# Patient Record
Sex: MALE | Race: WHITE
[De-identification: names, ages, dates, MRNs, and addresses within clinical notes are randomized per-mention and may not be internally consistent; named-entity substitution may affect disease eponyms.]

---
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
## 2024-07-20 ENCOUNTER — EMERGENCY (EMERGENCY)
Facility: HOSPITAL | Age: 24
LOS: 1 days | Discharge: OTHER INSTITUTION | End: 2024-07-20
Attending: EMERGENCY MEDICINE
Payer: COMMERCIAL

## 2024-07-20 VITALS
WEIGHT: 143.3 LBS | RESPIRATION RATE: 18 BRPM | OXYGEN SATURATION: 99 % | SYSTOLIC BLOOD PRESSURE: 144 MMHG | TEMPERATURE: 99 F | HEIGHT: 67 IN | DIASTOLIC BLOOD PRESSURE: 114 MMHG | HEART RATE: 91 BPM

## 2024-07-20 DIAGNOSIS — F30.9 MANIC EPISODE, UNSPECIFIED: ICD-10-CM

## 2024-07-20 LAB
ALBUMIN SERPL ELPH-MCNC: 4.1 G/DL — SIGNIFICANT CHANGE UP (ref 3.3–5.2)
ALP SERPL-CCNC: 74 U/L — SIGNIFICANT CHANGE UP (ref 40–120)
ALT FLD-CCNC: 17 U/L — SIGNIFICANT CHANGE UP
AMPHET UR-MCNC: NEGATIVE — SIGNIFICANT CHANGE UP
ANION GAP SERPL CALC-SCNC: 15 MMOL/L — SIGNIFICANT CHANGE UP (ref 5–17)
APAP SERPL-MCNC: <3 UG/ML — LOW (ref 10–26)
APPEARANCE UR: CLEAR — SIGNIFICANT CHANGE UP
AST SERPL-CCNC: 25 U/L — SIGNIFICANT CHANGE UP
BARBITURATES UR SCN-MCNC: NEGATIVE — SIGNIFICANT CHANGE UP
BASOPHILS # BLD AUTO: 0.04 K/UL — SIGNIFICANT CHANGE UP (ref 0–0.2)
BASOPHILS NFR BLD AUTO: 0.5 % — SIGNIFICANT CHANGE UP (ref 0–2)
BENZODIAZ UR-MCNC: NEGATIVE — SIGNIFICANT CHANGE UP
BILIRUB SERPL-MCNC: 0.4 MG/DL — SIGNIFICANT CHANGE UP (ref 0.4–2)
BILIRUB UR-MCNC: NEGATIVE — SIGNIFICANT CHANGE UP
BUN SERPL-MCNC: 13.6 MG/DL — SIGNIFICANT CHANGE UP (ref 8–20)
CALCIUM SERPL-MCNC: 9.3 MG/DL — SIGNIFICANT CHANGE UP (ref 8.4–10.5)
CHLORIDE SERPL-SCNC: 100 MMOL/L — SIGNIFICANT CHANGE UP (ref 96–108)
CO2 SERPL-SCNC: 23 MMOL/L — SIGNIFICANT CHANGE UP (ref 22–29)
COCAINE METAB.OTHER UR-MCNC: NEGATIVE — SIGNIFICANT CHANGE UP
COLOR SPEC: YELLOW — SIGNIFICANT CHANGE UP
CREAT SERPL-MCNC: 0.94 MG/DL — SIGNIFICANT CHANGE UP (ref 0.5–1.3)
DIFF PNL FLD: NEGATIVE — SIGNIFICANT CHANGE UP
EGFR: 117 ML/MIN/1.73M2 — SIGNIFICANT CHANGE UP
EOSINOPHIL # BLD AUTO: 0.01 K/UL — SIGNIFICANT CHANGE UP (ref 0–0.5)
EOSINOPHIL NFR BLD AUTO: 0.1 % — SIGNIFICANT CHANGE UP (ref 0–6)
ETHANOL SERPL-MCNC: <10 MG/DL — SIGNIFICANT CHANGE UP (ref 0–9)
FENTANYL UR QL SCN: NEGATIVE — SIGNIFICANT CHANGE UP
GLUCOSE SERPL-MCNC: 96 MG/DL — SIGNIFICANT CHANGE UP (ref 70–99)
GLUCOSE UR QL: NEGATIVE MG/DL — SIGNIFICANT CHANGE UP
HCT VFR BLD CALC: 41.9 % — SIGNIFICANT CHANGE UP (ref 39–50)
HGB BLD-MCNC: 15.1 G/DL — SIGNIFICANT CHANGE UP (ref 13–17)
IMM GRANULOCYTES NFR BLD AUTO: 0.3 % — SIGNIFICANT CHANGE UP (ref 0–0.9)
KETONES UR-MCNC: NEGATIVE MG/DL — SIGNIFICANT CHANGE UP
LEUKOCYTE ESTERASE UR-ACNC: NEGATIVE — SIGNIFICANT CHANGE UP
LYMPHOCYTES # BLD AUTO: 1.84 K/UL — SIGNIFICANT CHANGE UP (ref 1–3.3)
LYMPHOCYTES # BLD AUTO: 25.3 % — SIGNIFICANT CHANGE UP (ref 13–44)
MCHC RBC-ENTMCNC: 28.3 PG — SIGNIFICANT CHANGE UP (ref 27–34)
MCHC RBC-ENTMCNC: 36 GM/DL — SIGNIFICANT CHANGE UP (ref 32–36)
MCV RBC AUTO: 78.5 FL — LOW (ref 80–100)
METHADONE UR-MCNC: NEGATIVE — SIGNIFICANT CHANGE UP
MONOCYTES # BLD AUTO: 0.72 K/UL — SIGNIFICANT CHANGE UP (ref 0–0.9)
MONOCYTES NFR BLD AUTO: 9.9 % — SIGNIFICANT CHANGE UP (ref 2–14)
NEUTROPHILS # BLD AUTO: 4.65 K/UL — SIGNIFICANT CHANGE UP (ref 1.8–7.4)
NEUTROPHILS NFR BLD AUTO: 63.9 % — SIGNIFICANT CHANGE UP (ref 43–77)
NITRITE UR-MCNC: NEGATIVE — SIGNIFICANT CHANGE UP
OPIATES UR-MCNC: NEGATIVE — SIGNIFICANT CHANGE UP
PCP SPEC-MCNC: SIGNIFICANT CHANGE UP
PCP UR-MCNC: NEGATIVE — SIGNIFICANT CHANGE UP
PH UR: 7 — SIGNIFICANT CHANGE UP (ref 5–8)
PLATELET # BLD AUTO: 219 K/UL — SIGNIFICANT CHANGE UP (ref 150–400)
POTASSIUM SERPL-MCNC: 3.7 MMOL/L — SIGNIFICANT CHANGE UP (ref 3.5–5.3)
POTASSIUM SERPL-SCNC: 3.7 MMOL/L — SIGNIFICANT CHANGE UP (ref 3.5–5.3)
PROT SERPL-MCNC: 6.9 G/DL — SIGNIFICANT CHANGE UP (ref 6.6–8.7)
PROT UR-MCNC: NEGATIVE MG/DL — SIGNIFICANT CHANGE UP
RBC # BLD: 5.34 M/UL — SIGNIFICANT CHANGE UP (ref 4.2–5.8)
RBC # FLD: 12.2 % — SIGNIFICANT CHANGE UP (ref 10.3–14.5)
SALICYLATES SERPL-MCNC: <0.6 MG/DL — LOW (ref 10–20)
SARS-COV-2 RNA SPEC QL NAA+PROBE: SIGNIFICANT CHANGE UP
SODIUM SERPL-SCNC: 138 MMOL/L — SIGNIFICANT CHANGE UP (ref 135–145)
SP GR SPEC: <1.005 — LOW (ref 1–1.03)
THC UR QL: NEGATIVE — SIGNIFICANT CHANGE UP
UROBILINOGEN FLD QL: 0.2 MG/DL — SIGNIFICANT CHANGE UP (ref 0.2–1)
WBC # BLD: 7.28 K/UL — SIGNIFICANT CHANGE UP (ref 3.8–10.5)
WBC # FLD AUTO: 7.28 K/UL — SIGNIFICANT CHANGE UP (ref 3.8–10.5)

## 2024-07-20 RX ORDER — DIVALPROEX SODIUM 500 MG
500 TABLET, DELAYED RELEASE (ENTERIC COATED) ORAL
Refills: 0 | Status: ACTIVE | OUTPATIENT
Start: 2024-07-20 | End: 2025-06-18

## 2024-07-20 RX ORDER — LORAZEPAM 0.5 MG
2 TABLET ORAL ONCE
Refills: 0 | Status: DISCONTINUED | OUTPATIENT
Start: 2024-07-20 | End: 2024-07-20

## 2024-07-20 RX ORDER — DIVALPROEX SODIUM 500 MG
500 TABLET, DELAYED RELEASE (ENTERIC COATED) ORAL ONCE
Refills: 0 | Status: COMPLETED | OUTPATIENT
Start: 2024-07-20 | End: 2024-07-20

## 2024-07-20 RX ORDER — DIVALPROEX SODIUM 500 MG
1 TABLET, DELAYED RELEASE (ENTERIC COATED) ORAL
Qty: 60 | Refills: 0
Start: 2024-07-20 | End: 2024-08-18

## 2024-07-20 RX ORDER — HALOPERIDOL DECANOATE 100 MG/ML
5 VIAL (ML) INTRAMUSCULAR ONCE
Refills: 0 | Status: COMPLETED | OUTPATIENT
Start: 2024-07-20 | End: 2024-07-20

## 2024-07-20 RX ORDER — DIPHENHYDRAMINE HCL 12.5MG/5ML
50 ELIXIR ORAL EVERY 6 HOURS
Refills: 0 | Status: ACTIVE | OUTPATIENT
Start: 2024-07-20 | End: 2025-06-18

## 2024-07-20 RX ORDER — LORAZEPAM 0.5 MG
2 TABLET ORAL EVERY 4 HOURS
Refills: 0 | Status: DISCONTINUED | OUTPATIENT
Start: 2024-07-20 | End: 2024-07-20

## 2024-07-20 RX ORDER — DIAZEPAM 10 MG/1
5 TABLET ORAL ONCE
Refills: 0 | Status: DISCONTINUED | OUTPATIENT
Start: 2024-07-20 | End: 2024-07-20

## 2024-07-20 RX ORDER — HALOPERIDOL DECANOATE 100 MG/ML
5 VIAL (ML) INTRAMUSCULAR EVERY 6 HOURS
Refills: 0 | Status: ACTIVE | OUTPATIENT
Start: 2024-07-20 | End: 2025-06-18

## 2024-07-20 RX ADMIN — Medication 50 MILLIGRAM(S): at 16:28

## 2024-07-20 RX ADMIN — Medication 5 MILLIGRAM(S): at 16:41

## 2024-07-20 RX ADMIN — Medication 2 MILLIGRAM(S): at 16:41

## 2024-07-20 RX ADMIN — DIAZEPAM 5 MILLIGRAM(S): 10 TABLET ORAL at 13:37

## 2024-07-20 RX ADMIN — Medication 5 MILLIGRAM(S): at 16:28

## 2024-07-20 RX ADMIN — Medication 2 MILLIGRAM(S): at 16:28

## 2024-07-20 RX ADMIN — Medication 50 MILLIGRAM(S): at 11:04

## 2024-07-20 RX ADMIN — Medication 500 MILLIGRAM(S): at 14:11

## 2024-07-20 NOTE — ED ADULT TRIAGE NOTE - CHIEF COMPLAINT QUOTE
Pt bought in having maniac episode. pt lost dad in October and is saying his dad is talking to him and needs to tell him I love him one more time. Pt haven't slept in 3 days.

## 2024-07-20 NOTE — ED ADULT NURSE NOTE - CAS EDN DISCHARGE ASSESSMENT
Alert and oriented to person, place and time/Awake Alert and oriented to person, place and time/Patient baseline mental status

## 2024-07-20 NOTE — ED BEHAVIORAL HEALTH ASSESSMENT NOTE - RISK ASSESSMENT
RF: Presenting signs and symptoms, 1 prior admission in 2020   PF: domiciled with mother, full time student athlete at [employer]

## 2024-07-20 NOTE — ED BEHAVIORAL HEALTH ASSESSMENT NOTE - NSBHATTESTAPPBILLTIME_PSY_A_CORE
I attest my time as RAND is greater than 50% of the total combined time spent on qualifying patient care activities. I have reviewed and verified the documentation.

## 2024-07-20 NOTE — ED PROVIDER NOTE - PROGRESS NOTE DETAILS
Pura Augustin MD, Attending  Patient received at signout pending evaluation by psych.  Patient pacing in circles with pressured speech, talking about "needing to train and getting the best  in the world'.     Psych attending reevaluated patient, shared decision making with patient's family, who would prefer outpatient management.  Per psych attending, patient also had prior similar episode triggered due to drug use.    Per psych attending, he will send outpatient meds, social work will obtain family service league appointment for patient, patient will continue with outpatient follow-up. Pura Augustin MD, Attending   Pt seen by sw. During that process, girlfriend now expressing patient is worse and is concerned about taking him home. Pt reassessed at bedside, still manic. Spoke to girlfriend and sister myself, who both states they feel pt is worse and does not feel he is safe to go home. Sherry MURPHY aware, pt sent to . Pura Augustin MD, Attending  Patient received at signout pending re-evaluation by psych. Sherry MURPHY evaluated pt overnight, and planned to re-eval in the AM. Pt now pacing in circles with pressured speech, talking about "needing to train and getting the best  in the world'.     Dr. Chacko reevaluated patient, and states shared decision making with patient's family (girlfriend and sister), who would prefer outpatient management. Per psych attending, patient also had prior similar episode triggered due to drug use. He is recommending outpatient follow up and he will send meds, also recommending on sw to obtain family service league appointment for patient.

## 2024-07-20 NOTE — ED BEHAVIORAL HEALTH NOTE - BEHAVIORAL HEALTH NOTE
Social Work Note: SW following for coordination of care. Patient initially seen by psych MD and was T&R with family service league follow up. SW attempted to meet with patient with girlfriend and family present however patient sleeping. SW then attempted to meet with patient again with girlfriend present. SW attempted to discuss recommended follow up with family service league and patient difficult to engage also with bizarre behavior. Girlfriend reports she and family noticed patients symptoms have gotten worse since this morning and is now requesting inpatient psychiatric transfer. Case discussed with MD and  team. Patient brought back to  ED. MOY will continue to follow.

## 2024-07-20 NOTE — ED PROVIDER NOTE - PATIENT PORTAL LINK FT
You can access the FollowMyHealth Patient Portal offered by [facility] by registering at the following website: [email or web address]. By joining [facility]’s FollowMyHealth portal, you will also be able to view your health information using other applications (apps) compatible with our system.

## 2024-07-20 NOTE — ED PROVIDER NOTE - CROS ED PSYCH ALL NEG
- - - Fever    A fever is an increase in the body's temperature above 100.4°F (38°C) or higher. In adults and children older than three months, a brief mild or moderate fever generally has no long-term effect, and it usually does not require treatment. Many times, fevers are the result of viral infections, which are self-resolving.  However, certain symptoms or diagnostic tests may suggest a bacterial infection that may respond to antibiotics. Take medications as directed by your health care provider.    SEEK IMMEDIATE MEDICAL CARE IF YOU OR YOUR CHILD HAVE ANY OF THE FOLLOWING SYMPTOMS : shortness of breath, seizure, rash/stiff neck/headache, severe abdominal pain, persistent vomiting, any signs of dehydration, or if your child has a fever for over five (5) days.    Please follow up with your pediatrician as needed.

## 2024-07-20 NOTE — ED PROVIDER NOTE - OBJECTIVE STATEMENT
24-year-old male presents with manic behavior.  as per patient and girlfriend at bedside, the patient's father recently , and ever since then he has been "spiraling downward".  The patient is having increasingly pressured speech and rapid thoughts,  is starting to act erratically and cause concern amongst the patient's family and friends.  Patient denies any physical complaints, and denies drug or alcohol use.  Patient denies hallucinations or suicidal ideations.  Of note, patient reports that he once had a "2-week episode of psychosis" that he reports was  trigger due to drug use.

## 2024-07-20 NOTE — ED ADULT NURSE NOTE - OBJECTIVE STATEMENT
23 yo M presents for psych eval. Pt. endorses not being able to sleep x3 days. Pt. states he "has too many dreams and ambitions to sleep". Pt. w/ pressured speech. Girlfriend at bedside. Resp. equal and unlabored b/l. VSS. NAD. Comfort measures provided, safety measures implemented, call bell in reach. MD at bedside.

## 2024-07-20 NOTE — ED PROVIDER NOTE - ENMT, MLM
Airway patent, Nasal mucosa clear. Mouth with normal mucosa. Throat has no vesicles, no oropharyngeal exudates and uvula is midline.
no back pain, no gout, no musculoskeletal pain, no neck pain, and no weakness.

## 2024-07-20 NOTE — ED PROVIDER NOTE - NSFOLLOWUPINSTRUCTIONS_ED_ALL_ED_FT
** Take your medication as prescribed.   ** Follow up with family service league.      ** Go to the nearest Emergency Department if you experience any new or concerning symptoms, such as:   - chest pain  - difficulty breathing  - passing out  - unable to eat or drink  - unable to move or feel part of your body  - fever, chills  - agitation, racing thoughts, not sleeping, not eating  - thoughts of harming yourself, thoughts of harming others.   - hallucinations.

## 2024-07-20 NOTE — ED BEHAVIORAL HEALTH ASSESSMENT NOTE - DESCRIPTION
no acute events; no stat meds    ICU Vital Signs Last 24 Hrs  T(C): 36.7 (20 Jul 2024 07:51), Max: 37.1 (20 Jul 2024 01:05)  T(F): 98.1 (20 Jul 2024 07:51), Max: 98.8 (20 Jul 2024 01:05)  HR: 77 (20 Jul 2024 07:51) (77 - 91)  BP: 108/78 (20 Jul 2024 07:51) (108/78 - 144/114)  BP(mean): --  ABP: --  ABP(mean): --  RR: 18 (20 Jul 2024 07:51) (18 - 18)  SpO2: 96% (20 Jul 2024 07:51) (96% - 99%)    O2 Parameters below as of 20 Jul 2024 07:51  Patient On (Oxygen Delivery Method): room air Domiciled with mother; full time student at [employer]; plays soccer at [employer] none no acute events; 1 stat order of PO Ativan    ICU Vital Signs Last 24 Hrs  T(C): 36.7 (20 Jul 2024 07:51), Max: 37.1 (20 Jul 2024 01:05)  T(F): 98.1 (20 Jul 2024 07:51), Max: 98.8 (20 Jul 2024 01:05)  HR: 77 (20 Jul 2024 07:51) (77 - 91)  BP: 108/78 (20 Jul 2024 07:51) (108/78 - 144/114)  BP(mean): --  ABP: --  ABP(mean): --  RR: 18 (20 Jul 2024 07:51) (18 - 18)  SpO2: 96% (20 Jul 2024 07:51) (96% - 99%)    O2 Parameters below as of 20 Jul 2024 07:51  Patient On (Oxygen Delivery Method): room air Domiciled with mother; full time student at [employer] where he plays soccer; father  in October 1 stat order of PO Ativan initially however pt later became acutely agitated and was moved to  where he was medicated twice.     ICU Vital Signs Last 24 Hrs  T(C): 36.7 (20 Jul 2024 07:51), Max: 37.1 (20 Jul 2024 01:05)  T(F): 98.1 (20 Jul 2024 07:51), Max: 98.8 (20 Jul 2024 01:05)  HR: 77 (20 Jul 2024 07:51) (77 - 91)  BP: 108/78 (20 Jul 2024 07:51) (108/78 - 144/114)  BP(mean): --  ABP: --  ABP(mean): --  RR: 18 (20 Jul 2024 07:51) (18 - 18)  SpO2: 96% (20 Jul 2024 07:51) (96% - 99%)    O2 Parameters below as of 20 Jul 2024 07:51  Patient On (Oxygen Delivery Method): room air
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
## 2024-07-20 NOTE — ED BEHAVIORAL HEALTH ASSESSMENT NOTE - NS ED BHA ED COURSE PSYCHIATRIC MEDICATION GIVEN
None Involuntary Intramuscular (indication, name, dose, time)/Oral Medication (indication, name, dose, time)
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
## 2024-07-21 ENCOUNTER — INPATIENT (INPATIENT)
Facility: HOSPITAL | Age: 24
LOS: 17 days | Discharge: HOME | End: 2024-08-08
Attending: STUDENT IN AN ORGANIZED HEALTH CARE EDUCATION/TRAINING PROGRAM | Admitting: STUDENT IN AN ORGANIZED HEALTH CARE EDUCATION/TRAINING PROGRAM
Payer: COMMERCIAL

## 2024-07-21 VITALS
TEMPERATURE: 98 F | HEART RATE: 83 BPM | RESPIRATION RATE: 18 BRPM | OXYGEN SATURATION: 98 % | DIASTOLIC BLOOD PRESSURE: 70 MMHG | SYSTOLIC BLOOD PRESSURE: 120 MMHG

## 2024-07-21 VITALS — TEMPERATURE: 97 F

## 2024-07-21 DIAGNOSIS — F32.9 MAJOR DEPRESSIVE DISORDER, SINGLE EPISODE, UNSPECIFIED: ICD-10-CM

## 2024-07-21 PROCEDURE — 99222 1ST HOSP IP/OBS MODERATE 55: CPT

## 2024-07-21 RX ORDER — HYDROXYZINE HCL 50 MG/ML
50 VIAL (ML) INTRAMUSCULAR EVERY 4 HOURS
Refills: 0 | Status: DISCONTINUED | OUTPATIENT
Start: 2024-07-21 | End: 2024-08-01

## 2024-07-21 RX ORDER — LORAZEPAM 1 MG/1
2 TABLET ORAL EVERY 4 HOURS
Refills: 0 | Status: DISCONTINUED | OUTPATIENT
Start: 2024-07-21 | End: 2024-07-24

## 2024-07-21 RX ORDER — DIPHENHYDRAMINE HCL 25 MG
50 CAPSULE ORAL EVERY 6 HOURS
Refills: 0 | Status: DISCONTINUED | OUTPATIENT
Start: 2024-07-21 | End: 2024-08-08

## 2024-07-21 RX ORDER — DIVALPROEX SODIUM 125 MG/1
500 CAPSULE, COATED PELLETS ORAL
Refills: 0 | Status: DISCONTINUED | OUTPATIENT
Start: 2024-07-21 | End: 2024-07-21

## 2024-07-21 RX ORDER — PRAMIPEXOLE DIHYDROCHLORIDE 0.5 MG/1
5 TABLET ORAL ONCE
Refills: 0 | Status: DISCONTINUED | OUTPATIENT
Start: 2024-07-21 | End: 2024-08-08

## 2024-07-21 RX ORDER — LORAZEPAM 1 MG/1
2 TABLET ORAL ONCE
Refills: 0 | Status: DISCONTINUED | OUTPATIENT
Start: 2024-07-21 | End: 2024-07-24

## 2024-07-21 RX ORDER — RISPERIDONE 1 MG/1
0.5 TABLET, FILM COATED ORAL
Refills: 0 | Status: DISCONTINUED | OUTPATIENT
Start: 2024-07-21 | End: 2024-07-22

## 2024-07-21 RX ORDER — DIPHENHYDRAMINE HCL 25 MG
50 CAPSULE ORAL ONCE
Refills: 0 | Status: DISCONTINUED | OUTPATIENT
Start: 2024-07-21 | End: 2024-08-08

## 2024-07-21 RX ORDER — PRAMIPEXOLE DIHYDROCHLORIDE 0.5 MG/1
5 TABLET ORAL EVERY 6 HOURS
Refills: 0 | Status: DISCONTINUED | OUTPATIENT
Start: 2024-07-21 | End: 2024-08-08

## 2024-07-21 RX ADMIN — LORAZEPAM 2 MILLIGRAM(S): 1 TABLET ORAL at 20:21

## 2024-07-21 RX ADMIN — Medication 100 MILLIGRAM(S): at 20:21

## 2024-07-21 RX ADMIN — Medication 50 MILLIGRAM(S): at 17:05

## 2024-07-21 RX ADMIN — RISPERIDONE 0.5 MILLIGRAM(S): 1 TABLET, FILM COATED ORAL at 20:21

## 2024-07-21 NOTE — PSYCHIATRIC REHAB INITIAL EVALUATION - NSBHPRRECOMMEND_PSY_ALL_CORE
Patient was able to adequately engage with writer during initial session.  Patient was oriented to unit 1S as well as the role/function of  and inpatient psychiatric rehabilitation programming. Patient was able to identify an appropriately rehabilitation goal which is exhibiting a substantial reduction in elated/angry acting out, and pressured speech that prevents mutual conversation. Psychiatric rehabilitation staff will meet with patient weekly to review progress towards identified goal.

## 2024-07-21 NOTE — BH INPATIENT PSYCHIATRY ASSESSMENT NOTE - NSTXMANICGOAL_PSY_ALL_CORE
Be able to identify the early signs of tc (e.g. sleep and mood changes) and to employ coping strategies to minimize acting out
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
## 2024-07-21 NOTE — CHART NOTE - NSCHARTNOTEFT_GEN_A_CORE
Screening Medical Evaluation    Patient Admitted from: [facility] ED    [facility] admitting diagnosis: Major depressive disorder with single episode      PAST MEDICAL & SURGICAL HISTORY:  No Past Medical & Surgical History Noted      ALLERGIES  No Known Allergies    Intolerances      SOCIAL HISTORY:  Patient reports the last time he smoked marijuana was four (4)months ago and drinks two (2) beers on days that he does drink alcohol. Patient denies current use of tobacco/nicotine, or other substances.    FAMILY HISTORY:  No pertinent family history in first degree relatives.      MEDICATIONS  (STANDING):  QUEtiapine 100 milliGRAM(s) Oral at bedtime  risperiDONE   Tablet 0.5 milliGRAM(s) Oral two times a day    MEDICATIONS  (PRN):  diphenhydrAMINE 50 milliGRAM(s) Oral every 6 hours PRN Rash and/or Itching/agitation/eps prophylaxis  diphenhydrAMINE Injectable 50 milliGRAM(s) IntraMuscular once PRN agitation/eps prophylaxis  haloperidol     Tablet 5 milliGRAM(s) Oral every 6 hours PRN agitation  haloperidol    Injectable 5 milliGRAM(s) IntraMuscular once PRN severe agitation  hydrOXYzine hydrochloride 50 milliGRAM(s) Oral every 4 hours PRN anxiety  LORazepam     Tablet 2 milliGRAM(s) Oral every 4 hours PRN anxiety/agitation  LORazepam   Injectable 2 milliGRAM(s) IntraMuscular once PRN severe anxiety/severe agitation      Vital Signs Last 24 Hrs  T(C): 36.7 (21 Jul 2024 20:22), Max: 36.7 (21 Jul 2024 20:22)  T(F): 98.1 (21 Jul 2024 20:22), Max: 98.1 (21 Jul 2024 20:22)  HR: 94 (21 Jul 2024 09:00) (94 - 94)  BP: --  BP(mean): --  RR: --  SpO2: --      CAPILLARY BLOOD GLUCOSE      PHYSICAL EXAM:  GENERAL: NAD, well-developed  HEAD:  Atraumatic, Normocephalic  EYES: EOMI, PERRLA, conjunctiva and sclera clear  NECK: Supple, No JVD  CHEST/LUNG: Clear to auscultation bilaterally; No wheeze  HEART: Regular rate and rhythm; No murmurs, rubs, or gallops  ABDOMEN: Soft, Nontender, Nondistended; Bowel sounds present  EXTREMITIES:  2+ Peripheral Pulses, No clubbing, cyanosis, or edema  PSYCH: AAOx3  NEUROLOGY: non-focal  SKIN: No rashes or lesions    LABS:      RADIOLOGY & ADDITIONAL TESTS:      Assessment and Plan:  24M admitted to [facility] for Major depressive disorder with single episode w/no known PMHx seen at bedside for medical screening evaluation. Patient has no acute medical complaints at this time. Patient denies fever, chills, headache, dizziness, lightheadedness, N/V, SOB, cough, congestion, chest pain, abdominal pain, dysuria, hematuria, diarrhea, constipation. Physical exam unremarkable, VSS. Admission labs pending. F/u EKG in AM.     1.) Major depressive disorder with single episode: Refer to primary team documentation for recs

## 2024-07-21 NOTE — BH INPATIENT PSYCHIATRY ASSESSMENT NOTE - NSDCCRITERIA_PSY_ALL_CORE
PT will no longer have symptoms of paranoia and tc and will be able to adhere to an out patient treatment plan.

## 2024-07-21 NOTE — BH INPATIENT PSYCHIATRY ASSESSMENT NOTE - PAST PSYCHOTROPIC MEDICATION
none currently. Pt was prescribed Depakote and Seroquel in the ED but appeared paranoid with psychosis so was started on Risperidone here at [facility] today.
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
## 2024-07-21 NOTE — BH INPATIENT PSYCHIATRY ASSESSMENT NOTE - DESCRIPTION
Domiciled with mother. 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  Father  in October.
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
## 2024-07-21 NOTE — BH INPATIENT PSYCHIATRY ASSESSMENT NOTE - NSBHMETABOLIC_PSY_ALL_CORE_FT
BMI:   HbA1c:   Glucose:   BP: --Vital Signs Last 24 Hrs  T(C): 36.2 (07-21-24 @ 06:40), Max: 36.2 (07-21-24 @ 06:40)  T(F): 97.1 (07-21-24 @ 06:40), Max: 97.1 (07-21-24 @ 06:40)  HR: --  BP: --  BP(mean): --  RR: --  SpO2: --    Orthostatic VS  07-21-24 @ 06:40  Lying BP: --/-- HR: --  Sitting BP: 119/75 HR: 87  Standing BP: 117/75 HR: 117  Site: --  Mode: --    Lipid Panel:

## 2024-07-21 NOTE — BH INPATIENT PSYCHIATRY ASSESSMENT NOTE - NSBHCHARTREVIEWVS_PSY_A_CORE FT
Vital Signs Last 24 Hrs  T(C): 36.2 (07-21-24 @ 06:40), Max: 36.2 (07-21-24 @ 06:40)  T(F): 97.1 (07-21-24 @ 06:40), Max: 97.1 (07-21-24 @ 06:40)  HR: --  BP: --  BP(mean): --  RR: --  SpO2: --    Orthostatic VS  07-21-24 @ 06:40  Lying BP: --/-- HR: --  Sitting BP: 119/75 HR: 87  Standing BP: 117/75 HR: 117  Site: --  Mode: --

## 2024-07-21 NOTE — BH INPATIENT PSYCHIATRY ASSESSMENT NOTE - CURRENT MEDICATION
MEDICATIONS  (STANDING):  QUEtiapine 100 milliGRAM(s) Oral at bedtime  risperiDONE   Tablet 0.5 milliGRAM(s) Oral two times a day    MEDICATIONS  (PRN):  diphenhydrAMINE 50 milliGRAM(s) Oral every 6 hours PRN Rash and/or Itching/agitation/eps prophylaxis  diphenhydrAMINE Injectable 50 milliGRAM(s) IntraMuscular once PRN agitation/eps prophylaxis  haloperidol     Tablet 5 milliGRAM(s) Oral every 6 hours PRN agitation  haloperidol    Injectable 5 milliGRAM(s) IntraMuscular once PRN severe agitation  LORazepam     Tablet 2 milliGRAM(s) Oral every 4 hours PRN anxiety/agitation  LORazepam   Injectable 2 milliGRAM(s) IntraMuscular once PRN severe anxiety/severe agitation
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
## 2024-07-22 LAB
A1C WITH ESTIMATED AVERAGE GLUCOSE RESULT: 4.9 % — SIGNIFICANT CHANGE UP (ref 4–5.6)
CHOLEST SERPL-MCNC: 167 MG/DL — SIGNIFICANT CHANGE UP
ESTIMATED AVERAGE GLUCOSE: 94 — SIGNIFICANT CHANGE UP
HDLC SERPL-MCNC: 67 MG/DL — SIGNIFICANT CHANGE UP
LIPID PNL WITH DIRECT LDL SERPL: 91 MG/DL — SIGNIFICANT CHANGE UP
NON HDL CHOLESTEROL: 100 MG/DL — SIGNIFICANT CHANGE UP
TRIGL SERPL-MCNC: 45 MG/DL — SIGNIFICANT CHANGE UP
TSH SERPL-MCNC: 1.73 UIU/ML — SIGNIFICANT CHANGE UP (ref 0.27–4.2)

## 2024-07-22 PROCEDURE — 90853 GROUP PSYCHOTHERAPY: CPT

## 2024-07-22 PROCEDURE — 99232 SBSQ HOSP IP/OBS MODERATE 35: CPT

## 2024-07-22 PROCEDURE — 93010 ELECTROCARDIOGRAM REPORT: CPT

## 2024-07-22 RX ORDER — RISPERIDONE 1 MG/1
2 TABLET, FILM COATED ORAL AT BEDTIME
Refills: 0 | Status: DISCONTINUED | OUTPATIENT
Start: 2024-07-22 | End: 2024-07-24

## 2024-07-22 RX ADMIN — RISPERIDONE 2 MILLIGRAM(S): 1 TABLET, FILM COATED ORAL at 21:02

## 2024-07-22 RX ADMIN — Medication 100 MILLIGRAM(S): at 21:01

## 2024-07-22 RX ADMIN — LORAZEPAM 2 MILLIGRAM(S): 1 TABLET ORAL at 08:55

## 2024-07-22 RX ADMIN — LORAZEPAM 2 MILLIGRAM(S): 1 TABLET ORAL at 16:55

## 2024-07-22 RX ADMIN — RISPERIDONE 0.5 MILLIGRAM(S): 1 TABLET, FILM COATED ORAL at 08:44

## 2024-07-22 RX ADMIN — Medication 50 MILLIGRAM(S): at 21:02

## 2024-07-22 NOTE — BH INPATIENT PSYCHIATRY PROGRESS NOTE - NSBHCHARTREVIEWVS_PSY_A_CORE FT
Vital Signs Last 24 Hrs  T(C): 35.8 (07-22-24 @ 06:23), Max: 36.7 (07-21-24 @ 20:22)  T(F): 96.5 (07-22-24 @ 06:23), Max: 98.1 (07-21-24 @ 20:22)  HR: --  BP: --  BP(mean): --  RR: --  SpO2: --    Orthostatic VS  07-22-24 @ 06:23  Lying BP: --/-- HR: --  Sitting BP: 130/99 HR: 103  Standing BP: 116/87 HR: 106  Site: --  Mode: --  Orthostatic VS  07-21-24 @ 06:40  Lying BP: --/-- HR: --  Sitting BP: 119/75 HR: 87  Standing BP: 117/75 HR: 117  Site: --  Mode: --

## 2024-07-22 NOTE — BH INPATIENT PSYCHIATRY PROGRESS NOTE - CURRENT MEDICATION
MEDICATIONS  (STANDING):  QUEtiapine 100 milliGRAM(s) Oral at bedtime  risperiDONE   Tablet 0.5 milliGRAM(s) Oral two times a day    MEDICATIONS  (PRN):  diphenhydrAMINE 50 milliGRAM(s) Oral every 6 hours PRN Rash and/or Itching/agitation/eps prophylaxis  diphenhydrAMINE Injectable 50 milliGRAM(s) IntraMuscular once PRN agitation/eps prophylaxis  haloperidol     Tablet 5 milliGRAM(s) Oral every 6 hours PRN agitation  haloperidol    Injectable 5 milliGRAM(s) IntraMuscular once PRN severe agitation  hydrOXYzine hydrochloride 50 milliGRAM(s) Oral every 4 hours PRN anxiety  LORazepam     Tablet 2 milliGRAM(s) Oral every 4 hours PRN anxiety/agitation  LORazepam   Injectable 2 milliGRAM(s) IntraMuscular once PRN severe anxiety/severe agitation

## 2024-07-22 NOTE — BH INPATIENT PSYCHIATRY PROGRESS NOTE - NSBHFUPINTERVALHXFT_PSY_A_CORE
Chart reviewed, including vital signs, medication administration record, lab results, and nursing notes.   Case discussed with nursing, psychology, psych rehab, and social work in team meeting.   - No acute events overnight    Patient is seen in the group room under no acute distress and amenable to interview. The patient exhibits numerous signs of tc today, including pressured speech, grandiosity, increased activity, and indiscretion. He states that he needs to be discharged because he plans to be the “best  in the [place]”. He has a soccer match coming up, and he expresses his need to be discharged to play soccer, see his dog and his girlfriend. He is unable to recount the events that led up to his hospitalization. When asked, he states that he “let the opinions of others affect him “. He denies any symptoms of depression. Denies any symptoms of psychosis including hallucinations, paranoia, or delusions. Denies any recent drug use. Denies any history of suicidal ideation. He denies any issues with his medication.

## 2024-07-22 NOTE — BH SOCIAL WORK INITIAL PSYCHOSOCIAL EVALUATION - NSPTSTATEDGOAL_PSY_ALL_CORE
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
## 2024-07-22 NOTE — ED BEHAVIORAL HEALTH NOTE - BEHAVIORAL HEALTH NOTE
MOY Note: Pt was transferred to [facility] on 7/21/24 for IP psychiatric care. Notified by [facility] that the pt has healthfirst ins and precert is required. Called  [phone], spoke with Radha Jones completed, pending auth# [id number]. Clinicals faxed to [phone] for review. [facility] aware and will follow

## 2024-07-22 NOTE — BH INPATIENT PSYCHIATRY PROGRESS NOTE - PRN MEDS
MEDICATIONS  (PRN):  diphenhydrAMINE 50 milliGRAM(s) Oral every 6 hours PRN Rash and/or Itching/agitation/eps prophylaxis  diphenhydrAMINE Injectable 50 milliGRAM(s) IntraMuscular once PRN agitation/eps prophylaxis  haloperidol     Tablet 5 milliGRAM(s) Oral every 6 hours PRN agitation  haloperidol    Injectable 5 milliGRAM(s) IntraMuscular once PRN severe agitation  hydrOXYzine hydrochloride 50 milliGRAM(s) Oral every 4 hours PRN anxiety  LORazepam     Tablet 2 milliGRAM(s) Oral every 4 hours PRN anxiety/agitation  LORazepam   Injectable 2 milliGRAM(s) IntraMuscular once PRN severe anxiety/severe agitation

## 2024-07-22 NOTE — BH INPATIENT PSYCHIATRY PROGRESS NOTE - NSBHMETABOLIC_PSY_ALL_CORE_FT
BMI:   HbA1c: A1C with Estimated Average Glucose Result: 4.9 % (07-22-24 @ 09:15)    Glucose:   BP: --Vital Signs Last 24 Hrs  T(C): 35.8 (07-22-24 @ 06:23), Max: 36.7 (07-21-24 @ 20:22)  T(F): 96.5 (07-22-24 @ 06:23), Max: 98.1 (07-21-24 @ 20:22)  HR: --  BP: --  BP(mean): --  RR: --  SpO2: --    Orthostatic VS  07-22-24 @ 06:23  Lying BP: --/-- HR: --  Sitting BP: 130/99 HR: 103  Standing BP: 116/87 HR: 106  Site: --  Mode: --  Orthostatic VS  07-21-24 @ 06:40  Lying BP: --/-- HR: --  Sitting BP: 119/75 HR: 87  Standing BP: 117/75 HR: 117  Site: --  Mode: --    Lipid Panel: Date/Time: 07-22-24 @ 09:15  Cholesterol, Serum: 167  LDL Cholesterol Calculated: 91  HDL Cholesterol, Serum: 67  Total Cholesterol/HDL Ration Measurement: --  Triglycerides, Serum: 45

## 2024-07-23 PROCEDURE — 99232 SBSQ HOSP IP/OBS MODERATE 35: CPT

## 2024-07-23 RX ORDER — MELATONIN 3 MG
5 TABLET ORAL ONCE
Refills: 0 | Status: DISCONTINUED | OUTPATIENT
Start: 2024-07-23 | End: 2024-07-29

## 2024-07-23 RX ORDER — TRAZODONE HCL 100 MG
50 TABLET ORAL AT BEDTIME
Refills: 0 | Status: DISCONTINUED | OUTPATIENT
Start: 2024-07-23 | End: 2024-07-30

## 2024-07-23 RX ADMIN — Medication 50 MILLIGRAM(S): at 11:12

## 2024-07-23 RX ADMIN — LORAZEPAM 2 MILLIGRAM(S): 1 TABLET ORAL at 09:34

## 2024-07-23 RX ADMIN — Medication 50 MILLIGRAM(S): at 11:19

## 2024-07-23 RX ADMIN — Medication 50 MILLIGRAM(S): at 22:47

## 2024-07-23 RX ADMIN — RISPERIDONE 2 MILLIGRAM(S): 1 TABLET, FILM COATED ORAL at 20:02

## 2024-07-23 RX ADMIN — Medication 50 MILLIGRAM(S): at 20:02

## 2024-07-23 NOTE — BH INPATIENT PSYCHIATRY PROGRESS NOTE - NSBHCHARTREVIEWVS_PSY_A_CORE FT
Vital Signs Last 24 Hrs  T(C): 36.7 (07-23-24 @ 08:11), Max: 36.7 (07-23-24 @ 08:11)  T(F): 98 (07-23-24 @ 08:11), Max: 98 (07-23-24 @ 08:11)  HR: --  BP: --  BP(mean): --  RR: 17 (07-23-24 @ 08:11) (17 - 17)  SpO2: --    Orthostatic VS  07-23-24 @ 08:11  Lying BP: --/-- HR: --  Sitting BP: 137/87 HR: 96  Standing BP: 118/74 HR: 100  Site: upper right arm  Mode: electronic  Orthostatic VS  07-22-24 @ 06:23  Lying BP: --/-- HR: --  Sitting BP: 130/99 HR: 103  Standing BP: 116/87 HR: 106  Site: --  Mode: --

## 2024-07-23 NOTE — BH INPATIENT PSYCHIATRY PROGRESS NOTE - NSBHFUPINTERVALHXFT_PSY_A_CORE
Chart reviewed, including vital signs, medication administration record, lab results, and nursing notes.   Case discussed with nursing, psychology, psych rehab, and social work in team meeting.   - No acute events overnight    Patient is seen in the group room under no acute distress and amenable to interview. The patient continues to exhibit signs of tc today, including pressured speech, grandiosity, and mood lability. He agrees that he was exhibiting symptoms of tc previously, but now feels that he is better and is ready to be discharged. He is looking forward to seeing his girlfriend and dog. He’s also looking forward to training for his next soccer match. We discussed the plan to discontinue Seroquel and trazodone to help with sleep. Patient reports stable sleep and appetite. Denies SI/HI/AVH. Reports no medication side effects.

## 2024-07-23 NOTE — BH INPATIENT PSYCHIATRY PROGRESS NOTE - CURRENT MEDICATION
MEDICATIONS  (STANDING):  melatonin. 5 milliGRAM(s) Oral once  risperiDONE   Tablet 2 milliGRAM(s) Oral at bedtime  traZODone 50 milliGRAM(s) Oral at bedtime    MEDICATIONS  (PRN):  diphenhydrAMINE 50 milliGRAM(s) Oral every 6 hours PRN Rash and/or Itching/agitation/eps prophylaxis  diphenhydrAMINE Injectable 50 milliGRAM(s) IntraMuscular once PRN agitation/eps prophylaxis  haloperidol     Tablet 5 milliGRAM(s) Oral every 6 hours PRN agitation  haloperidol    Injectable 5 milliGRAM(s) IntraMuscular once PRN severe agitation  hydrOXYzine hydrochloride 50 milliGRAM(s) Oral every 4 hours PRN anxiety  LORazepam     Tablet 2 milliGRAM(s) Oral every 4 hours PRN anxiety/agitation  LORazepam   Injectable 2 milliGRAM(s) IntraMuscular once PRN severe anxiety/severe agitation

## 2024-07-23 NOTE — BH INPATIENT PSYCHIATRY PROGRESS NOTE - NSBHMETABOLIC_PSY_ALL_CORE_FT
BMI:   HbA1c: A1C with Estimated Average Glucose Result: 4.9 % (07-22-24 @ 09:15)    Glucose:   BP: --Vital Signs Last 24 Hrs  T(C): 36.7 (07-23-24 @ 08:11), Max: 36.7 (07-23-24 @ 08:11)  T(F): 98 (07-23-24 @ 08:11), Max: 98 (07-23-24 @ 08:11)  HR: --  BP: --  BP(mean): --  RR: 17 (07-23-24 @ 08:11) (17 - 17)  SpO2: --    Orthostatic VS  07-23-24 @ 08:11  Lying BP: --/-- HR: --  Sitting BP: 137/87 HR: 96  Standing BP: 118/74 HR: 100  Site: upper right arm  Mode: electronic  Orthostatic VS  07-22-24 @ 06:23  Lying BP: --/-- HR: --  Sitting BP: 130/99 HR: 103  Standing BP: 116/87 HR: 106  Site: --  Mode: --    Lipid Panel: Date/Time: 07-22-24 @ 09:15  Cholesterol, Serum: 167  LDL Cholesterol Calculated: 91  HDL Cholesterol, Serum: 67  Total Cholesterol/HDL Ration Measurement: --  Triglycerides, Serum: 45

## 2024-07-24 PROCEDURE — 90853 GROUP PSYCHOTHERAPY: CPT

## 2024-07-24 PROCEDURE — 99232 SBSQ HOSP IP/OBS MODERATE 35: CPT

## 2024-07-24 RX ORDER — LORAZEPAM 1 MG/1
2 TABLET ORAL ONCE
Refills: 0 | Status: DISCONTINUED | OUTPATIENT
Start: 2024-07-24 | End: 2024-07-31

## 2024-07-24 RX ORDER — LORAZEPAM 1 MG/1
2 TABLET ORAL AT BEDTIME
Refills: 0 | Status: DISCONTINUED | OUTPATIENT
Start: 2024-07-24 | End: 2024-07-30

## 2024-07-24 RX ORDER — LORAZEPAM 1 MG/1
2 TABLET ORAL EVERY 4 HOURS
Refills: 0 | Status: DISCONTINUED | OUTPATIENT
Start: 2024-07-24 | End: 2024-07-31

## 2024-07-24 RX ORDER — RISPERIDONE 1 MG/1
3 TABLET, FILM COATED ORAL AT BEDTIME
Refills: 0 | Status: DISCONTINUED | OUTPATIENT
Start: 2024-07-24 | End: 2024-07-26

## 2024-07-24 RX ADMIN — LORAZEPAM 2 MILLIGRAM(S): 1 TABLET ORAL at 21:08

## 2024-07-24 RX ADMIN — LORAZEPAM 2 MILLIGRAM(S): 1 TABLET ORAL at 10:15

## 2024-07-24 RX ADMIN — Medication 50 MILLIGRAM(S): at 22:07

## 2024-07-24 RX ADMIN — RISPERIDONE 3 MILLIGRAM(S): 1 TABLET, FILM COATED ORAL at 22:07

## 2024-07-24 NOTE — BH INPATIENT PSYCHIATRY PROGRESS NOTE - NSBHMETABOLIC_PSY_ALL_CORE_FT
BMI:   HbA1c: A1C with Estimated Average Glucose Result: 4.9 % (07-22-24 @ 09:15)    Glucose:   BP: --Vital Signs Last 24 Hrs  T(C): 36.4 (07-24-24 @ 06:27), Max: 36.7 (07-23-24 @ 20:14)  T(F): 97.6 (07-24-24 @ 06:27), Max: 98.1 (07-23-24 @ 20:14)  HR: --  BP: --  BP(mean): --  RR: 19 (07-24-24 @ 06:27) (19 - 19)  SpO2: --    Orthostatic VS  07-24-24 @ 06:27  Lying BP: --/-- HR: --  Sitting BP: 128/80 HR: 101  Standing BP: 128/85 HR: 108  Site: --  Mode: --  Orthostatic VS  07-23-24 @ 08:11  Lying BP: --/-- HR: --  Sitting BP: 137/87 HR: 96  Standing BP: 118/74 HR: 100  Site: upper right arm  Mode: electronic    Lipid Panel: Date/Time: 07-22-24 @ 09:15  Cholesterol, Serum: 167  LDL Cholesterol Calculated: 91  HDL Cholesterol, Serum: 67  Total Cholesterol/HDL Ration Measurement: --  Triglycerides, Serum: 45

## 2024-07-24 NOTE — BH INPATIENT PSYCHIATRY PROGRESS NOTE - NSBHCHARTREVIEWVS_PSY_A_CORE FT
Vital Signs Last 24 Hrs  T(C): 36.4 (07-24-24 @ 06:27), Max: 36.7 (07-23-24 @ 20:14)  T(F): 97.6 (07-24-24 @ 06:27), Max: 98.1 (07-23-24 @ 20:14)  HR: --  BP: --  BP(mean): --  RR: 19 (07-24-24 @ 06:27) (19 - 19)  SpO2: --    Orthostatic VS  07-24-24 @ 06:27  Lying BP: --/-- HR: --  Sitting BP: 128/80 HR: 101  Standing BP: 128/85 HR: 108  Site: --  Mode: --  Orthostatic VS  07-23-24 @ 08:11  Lying BP: --/-- HR: --  Sitting BP: 137/87 HR: 96  Standing BP: 118/74 HR: 100  Site: upper right arm  Mode: electronic

## 2024-07-24 NOTE — BH INPATIENT PSYCHIATRY PROGRESS NOTE - CURRENT MEDICATION
MEDICATIONS  (STANDING):  LORazepam     Tablet 2 milliGRAM(s) Oral at bedtime  melatonin. 5 milliGRAM(s) Oral once  risperiDONE   Tablet 3 milliGRAM(s) Oral at bedtime  traZODone 50 milliGRAM(s) Oral at bedtime    MEDICATIONS  (PRN):  diphenhydrAMINE 50 milliGRAM(s) Oral every 6 hours PRN Rash and/or Itching/agitation/eps prophylaxis  diphenhydrAMINE Injectable 50 milliGRAM(s) IntraMuscular once PRN agitation/eps prophylaxis  haloperidol     Tablet 5 milliGRAM(s) Oral every 6 hours PRN agitation  haloperidol    Injectable 5 milliGRAM(s) IntraMuscular once PRN severe agitation  hydrOXYzine hydrochloride 50 milliGRAM(s) Oral every 4 hours PRN anxiety  LORazepam     Tablet 2 milliGRAM(s) Oral every 4 hours PRN anxiety/agitation  LORazepam   Injectable 2 milliGRAM(s) IntraMuscular once PRN severe anxiety/severe agitation

## 2024-07-24 NOTE — BH INPATIENT PSYCHIATRY PROGRESS NOTE - NSBHFUPINTERVALHXFT_PSY_A_CORE
Chart reviewed, including vital signs, medication administration record, lab results, and nursing notes.   Case discussed with nursing, psychology, psych rehab, and social work in team meeting.   - No acute events overnight    Patient is seen in the group room under no acute distress and amenable to interview. The patient reports feeling nervous about his continued hospitalization. He continues to exhibit signs of tc, including grandiosity, pressured speech, and tangential thought process. 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 He believes that there will be a “documentary made about him “after he leaves. Reports no medication side effects. Reports stable appetite. Denies SI/HI/AVH. We discussed the plan to increase the dose of Risperdal and Ativan to help with sleep.

## 2024-07-25 PROCEDURE — 99232 SBSQ HOSP IP/OBS MODERATE 35: CPT

## 2024-07-25 PROCEDURE — 90853 GROUP PSYCHOTHERAPY: CPT

## 2024-07-25 RX ADMIN — LORAZEPAM 2 MILLIGRAM(S): 1 TABLET ORAL at 21:32

## 2024-07-25 RX ADMIN — RISPERIDONE 3 MILLIGRAM(S): 1 TABLET, FILM COATED ORAL at 21:32

## 2024-07-25 RX ADMIN — Medication 50 MILLIGRAM(S): at 21:31

## 2024-07-25 NOTE — BH INPATIENT PSYCHIATRY PROGRESS NOTE - NSBHCHARTREVIEWVS_PSY_A_CORE FT
Vital Signs Last 24 Hrs  T(C): 36.3 (07-25-24 @ 08:41), Max: 36.7 (07-24-24 @ 20:57)  T(F): 97.4 (07-25-24 @ 08:41), Max: 98 (07-24-24 @ 20:57)  HR: --  BP: --  BP(mean): --  RR: 16 (07-25-24 @ 08:41) (16 - 16)  SpO2: --    Orthostatic VS  07-25-24 @ 08:41  Lying BP: --/-- HR: --  Sitting BP: 116/75 HR: 79  Standing BP: 105/62 HR: 100  Site: --  Mode: --  Orthostatic VS  07-24-24 @ 06:27  Lying BP: --/-- HR: --  Sitting BP: 128/80 HR: 101  Standing BP: 128/85 HR: 108  Site: --  Mode: --

## 2024-07-25 NOTE — BH INPATIENT PSYCHIATRY PROGRESS NOTE - NSBHMETABOLIC_PSY_ALL_CORE_FT
BMI:   HbA1c: A1C with Estimated Average Glucose Result: 4.9 % (07-22-24 @ 09:15)    Glucose:   BP: --Vital Signs Last 24 Hrs  T(C): 36.3 (07-25-24 @ 08:41), Max: 36.7 (07-24-24 @ 20:57)  T(F): 97.4 (07-25-24 @ 08:41), Max: 98 (07-24-24 @ 20:57)  HR: --  BP: --  BP(mean): --  RR: 16 (07-25-24 @ 08:41) (16 - 16)  SpO2: --    Orthostatic VS  07-25-24 @ 08:41  Lying BP: --/-- HR: --  Sitting BP: 116/75 HR: 79  Standing BP: 105/62 HR: 100  Site: --  Mode: --  Orthostatic VS  07-24-24 @ 06:27  Lying BP: --/-- HR: --  Sitting BP: 128/80 HR: 101  Standing BP: 128/85 HR: 108  Site: --  Mode: --    Lipid Panel: Date/Time: 07-22-24 @ 09:15  Cholesterol, Serum: 167  LDL Cholesterol Calculated: 91  HDL Cholesterol, Serum: 67  Total Cholesterol/HDL Ration Measurement: --  Triglycerides, Serum: 45

## 2024-07-25 NOTE — BH INPATIENT PSYCHIATRY PROGRESS NOTE - NSBHFUPINTERVALHXFT_PSY_A_CORE
Chart reviewed, including vital signs, medication administration record, lab results, and nursing notes.   Case discussed with nursing, psychology, psych rehab, and social work in team meeting.   - No acute events overnight    Patient is seen in the group room under no acute distress and amenable to interview. The patient’s manic symptoms appear much improved. States that he slept much better last night. He agrees with the diagnosis of bipolar disorder and the assessment that he was admitted for a manic episode. Patient reports stable sleep and appetite. Denies SI/HI/AVH. Reports no medication side effects.

## 2024-07-26 PROCEDURE — 99232 SBSQ HOSP IP/OBS MODERATE 35: CPT

## 2024-07-26 PROCEDURE — 90853 GROUP PSYCHOTHERAPY: CPT

## 2024-07-26 RX ORDER — RISPERIDONE 1 MG/1
4 TABLET, FILM COATED ORAL AT BEDTIME
Refills: 0 | Status: DISCONTINUED | OUTPATIENT
Start: 2024-07-26 | End: 2024-07-29

## 2024-07-26 RX ADMIN — RISPERIDONE 4 MILLIGRAM(S): 1 TABLET, FILM COATED ORAL at 22:24

## 2024-07-26 RX ADMIN — LORAZEPAM 2 MILLIGRAM(S): 1 TABLET ORAL at 22:25

## 2024-07-26 RX ADMIN — Medication 50 MILLIGRAM(S): at 22:25

## 2024-07-26 NOTE — BH INPATIENT PSYCHIATRY PROGRESS NOTE - NSBHFUPINTERVALHXFT_PSY_A_CORE
Chart reviewed, including vital signs, medication administration record, lab results, and nursing notes.   Case discussed with nursing, psychology, psych rehab, and social work in team meeting.   - No acute events overnight    Patient is seen in the group room under no acute distress and amenable to interview. The patient appears more pressured today. 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 He requests discharge again. We discuss the plan to increase the dose of Risperdal. Patient reports stable sleep and appetite. Denies SI/HI/AVH. Reports no medication side effects.

## 2024-07-26 NOTE — BH INPATIENT PSYCHIATRY PROGRESS NOTE - CURRENT MEDICATION
MEDICATIONS  (STANDING):  LORazepam     Tablet 2 milliGRAM(s) Oral at bedtime  melatonin. 5 milliGRAM(s) Oral once  risperiDONE   Tablet 4 milliGRAM(s) Oral at bedtime  traZODone 50 milliGRAM(s) Oral at bedtime    MEDICATIONS  (PRN):  diphenhydrAMINE 50 milliGRAM(s) Oral every 6 hours PRN Rash and/or Itching/agitation/eps prophylaxis  diphenhydrAMINE Injectable 50 milliGRAM(s) IntraMuscular once PRN agitation/eps prophylaxis  haloperidol     Tablet 5 milliGRAM(s) Oral every 6 hours PRN agitation  haloperidol    Injectable 5 milliGRAM(s) IntraMuscular once PRN severe agitation  hydrOXYzine hydrochloride 50 milliGRAM(s) Oral every 4 hours PRN anxiety  LORazepam     Tablet 2 milliGRAM(s) Oral every 4 hours PRN anxiety/agitation  LORazepam   Injectable 2 milliGRAM(s) IntraMuscular once PRN severe anxiety/severe agitation

## 2024-07-26 NOTE — BH INPATIENT PSYCHIATRY PROGRESS NOTE - NSBHCHARTREVIEWVS_PSY_A_CORE FT
Vital Signs Last 24 Hrs  T(C): 36.4 (07-26-24 @ 06:11), Max: 37.7 (07-25-24 @ 20:52)  T(F): 97.6 (07-26-24 @ 06:11), Max: 99.8 (07-25-24 @ 20:52)  HR: --  BP: --  BP(mean): --  RR: 18 (07-26-24 @ 06:11) (18 - 18)  SpO2: --    Orthostatic VS  07-26-24 @ 06:11  Lying BP: --/-- HR: --  Sitting BP: 128/80 HR: 88  Standing BP: 135/90 HR: 92  Site: --  Mode: --  Orthostatic VS  07-25-24 @ 08:41  Lying BP: --/-- HR: --  Sitting BP: 116/75 HR: 79  Standing BP: 105/62 HR: 100  Site: --  Mode: --

## 2024-07-26 NOTE — BH INPATIENT PSYCHIATRY PROGRESS NOTE - NSBHMETABOLIC_PSY_ALL_CORE_FT
BMI:   HbA1c: A1C with Estimated Average Glucose Result: 4.9 % (07-22-24 @ 09:15)    Glucose:   BP: --Vital Signs Last 24 Hrs  T(C): 36.4 (07-26-24 @ 06:11), Max: 37.7 (07-25-24 @ 20:52)  T(F): 97.6 (07-26-24 @ 06:11), Max: 99.8 (07-25-24 @ 20:52)  HR: --  BP: --  BP(mean): --  RR: 18 (07-26-24 @ 06:11) (18 - 18)  SpO2: --    Orthostatic VS  07-26-24 @ 06:11  Lying BP: --/-- HR: --  Sitting BP: 128/80 HR: 88  Standing BP: 135/90 HR: 92  Site: --  Mode: --  Orthostatic VS  07-25-24 @ 08:41  Lying BP: --/-- HR: --  Sitting BP: 116/75 HR: 79  Standing BP: 105/62 HR: 100  Site: --  Mode: --    Lipid Panel: Date/Time: 07-22-24 @ 09:15  Cholesterol, Serum: 167  LDL Cholesterol Calculated: 91  HDL Cholesterol, Serum: 67  Total Cholesterol/HDL Ration Measurement: --  Triglycerides, Serum: 45

## 2024-07-27 PROCEDURE — 99231 SBSQ HOSP IP/OBS SF/LOW 25: CPT

## 2024-07-27 RX ADMIN — LORAZEPAM 2 MILLIGRAM(S): 1 TABLET ORAL at 21:25

## 2024-07-27 RX ADMIN — RISPERIDONE 4 MILLIGRAM(S): 1 TABLET, FILM COATED ORAL at 21:25

## 2024-07-27 RX ADMIN — Medication 50 MILLIGRAM(S): at 21:25

## 2024-07-27 NOTE — BH INPATIENT PSYCHIATRY PROGRESS NOTE - NSBHMETABOLIC_PSY_ALL_CORE_FT
BMI:   HbA1c: A1C with Estimated Average Glucose Result: 4.9 % (07-22-24 @ 09:15)    Glucose:   BP: --Vital Signs Last 24 Hrs  T(C): 36.6 (07-27-24 @ 06:48), Max: 37 (07-26-24 @ 20:27)  T(F): 97.8 (07-27-24 @ 06:48), Max: 98.6 (07-26-24 @ 20:27)  HR: --  BP: --  BP(mean): --  RR: 19 (07-27-24 @ 06:48) (19 - 19)  SpO2: --    Orthostatic VS  07-27-24 @ 06:48  Lying BP: --/-- HR: --  Sitting BP: 132/75 HR: 82  Standing BP: 127/73 HR: 86  Site: --  Mode: --  Orthostatic VS  07-26-24 @ 06:11  Lying BP: --/-- HR: --  Sitting BP: 128/80 HR: 88  Standing BP: 135/90 HR: 92  Site: --  Mode: --    Lipid Panel: Date/Time: 07-22-24 @ 09:15  Cholesterol, Serum: 167  LDL Cholesterol Calculated: 91  HDL Cholesterol, Serum: 67  Total Cholesterol/HDL Ration Measurement: --  Triglycerides, Serum: 45

## 2024-07-27 NOTE — BH INPATIENT PSYCHIATRY PROGRESS NOTE - NSBHCHARTREVIEWVS_PSY_A_CORE FT
Vital Signs Last 24 Hrs  T(C): 36.6 (07-27-24 @ 06:48), Max: 37 (07-26-24 @ 20:27)  T(F): 97.8 (07-27-24 @ 06:48), Max: 98.6 (07-26-24 @ 20:27)  HR: --  BP: --  BP(mean): --  RR: 19 (07-27-24 @ 06:48) (19 - 19)  SpO2: --    Orthostatic VS  07-27-24 @ 06:48  Lying BP: --/-- HR: --  Sitting BP: 132/75 HR: 82  Standing BP: 127/73 HR: 86  Site: --  Mode: --  Orthostatic VS  07-26-24 @ 06:11  Lying BP: --/-- HR: --  Sitting BP: 128/80 HR: 88  Standing BP: 135/90 HR: 92  Site: --  Mode: --

## 2024-07-28 PROCEDURE — 99231 SBSQ HOSP IP/OBS SF/LOW 25: CPT

## 2024-07-28 RX ADMIN — RISPERIDONE 4 MILLIGRAM(S): 1 TABLET, FILM COATED ORAL at 20:57

## 2024-07-28 RX ADMIN — LORAZEPAM 2 MILLIGRAM(S): 1 TABLET ORAL at 20:57

## 2024-07-28 RX ADMIN — Medication 50 MILLIGRAM(S): at 20:57

## 2024-07-28 NOTE — BH INPATIENT PSYCHIATRY PROGRESS NOTE - NSBHCHARTREVIEWVS_PSY_A_CORE FT
Vital Signs Last 24 Hrs  T(C): 36.6 (07-27-24 @ 20:52), Max: 36.6 (07-27-24 @ 20:52)  T(F): 97.9 (07-27-24 @ 20:52), Max: 97.9 (07-27-24 @ 20:52)  HR: --  BP: --  BP(mean): --  RR: --  SpO2: --    Orthostatic VS  07-27-24 @ 06:48  Lying BP: --/-- HR: --  Sitting BP: 132/75 HR: 82  Standing BP: 127/73 HR: 86  Site: --  Mode: --   Vital Signs Last 24 Hrs  T(C): 36.7 (07-28-24 @ 21:34), Max: 36.7 (07-28-24 @ 21:34)  T(F): 98.1 (07-28-24 @ 21:34), Max: 98.1 (07-28-24 @ 21:34)  HR: --  BP: --  BP(mean): --  RR: --  SpO2: --    Orthostatic VS  07-27-24 @ 06:48  Lying BP: --/-- HR: --  Sitting BP: 132/75 HR: 82  Standing BP: 127/73 HR: 86  Site: --  Mode: --

## 2024-07-28 NOTE — BH INPATIENT PSYCHIATRY PROGRESS NOTE - NSBHFUPINTERVALHXFT_PSY_A_CORE
Chart reviewed, including vital signs, medication administration record, lab results, and nursing notes.   Case discussed with nursing staff   - No acute events overnight the day room where he is working on a paper and says he wants to finish with the colors he is using and wants to talk later.   Denies SI/HI/AVH. Reports no medication side effects. Chart reviewed, including vital signs, medication administration record, lab results, and nursing notes.   Case discussed with nursing staff   - No acute events overnight the day room where he talked about his plans for the future and his worry that he is missing too much by being in the hospital.   Denies SI/HI/AVH. Reports no medication side effects.

## 2024-07-28 NOTE — BH INPATIENT PSYCHIATRY PROGRESS NOTE - NSBHMETABOLIC_PSY_ALL_CORE_FT
BMI:   HbA1c: A1C with Estimated Average Glucose Result: 4.9 % (07-22-24 @ 09:15)    Glucose:   BP: --Vital Signs Last 24 Hrs  T(C): 36.6 (07-27-24 @ 20:52), Max: 36.6 (07-27-24 @ 20:52)  T(F): 97.9 (07-27-24 @ 20:52), Max: 97.9 (07-27-24 @ 20:52)  HR: --  BP: --  BP(mean): --  RR: --  SpO2: --    Orthostatic VS  07-27-24 @ 06:48  Lying BP: --/-- HR: --  Sitting BP: 132/75 HR: 82  Standing BP: 127/73 HR: 86  Site: --  Mode: --    Lipid Panel: Date/Time: 07-22-24 @ 09:15  Cholesterol, Serum: 167  LDL Cholesterol Calculated: 91  HDL Cholesterol, Serum: 67  Total Cholesterol/HDL Ration Measurement: --  Triglycerides, Serum: 45   BMI:   HbA1c: A1C with Estimated Average Glucose Result: 4.9 % (07-22-24 @ 09:15)    Glucose:   BP: --Vital Signs Last 24 Hrs  T(C): 36.7 (07-28-24 @ 21:34), Max: 36.7 (07-28-24 @ 21:34)  T(F): 98.1 (07-28-24 @ 21:34), Max: 98.1 (07-28-24 @ 21:34)  HR: --  BP: --  BP(mean): --  RR: --  SpO2: --    Orthostatic VS  07-27-24 @ 06:48  Lying BP: --/-- HR: --  Sitting BP: 132/75 HR: 82  Standing BP: 127/73 HR: 86  Site: --  Mode: --    Lipid Panel: Date/Time: 07-22-24 @ 09:15  Cholesterol, Serum: 167  LDL Cholesterol Calculated: 91  HDL Cholesterol, Serum: 67  Total Cholesterol/HDL Ration Measurement: --  Triglycerides, Serum: 45

## 2024-07-29 PROCEDURE — 99232 SBSQ HOSP IP/OBS MODERATE 35: CPT

## 2024-07-29 RX ORDER — MELATONIN 3 MG
5 TABLET ORAL ONCE
Refills: 0 | Status: COMPLETED | OUTPATIENT
Start: 2024-07-29 | End: 2024-07-29

## 2024-07-29 RX ORDER — RISPERIDONE 1 MG/1
6 TABLET, FILM COATED ORAL AT BEDTIME
Refills: 0 | Status: DISCONTINUED | OUTPATIENT
Start: 2024-07-29 | End: 2024-08-08

## 2024-07-29 RX ADMIN — LORAZEPAM 2 MILLIGRAM(S): 1 TABLET ORAL at 09:39

## 2024-07-29 RX ADMIN — Medication 5 MILLIGRAM(S): at 21:56

## 2024-07-29 RX ADMIN — LORAZEPAM 2 MILLIGRAM(S): 1 TABLET ORAL at 21:48

## 2024-07-29 RX ADMIN — RISPERIDONE 6 MILLIGRAM(S): 1 TABLET, FILM COATED ORAL at 21:48

## 2024-07-29 NOTE — BH INPATIENT PSYCHIATRY PROGRESS NOTE - NSBHCHARTREVIEWVS_PSY_A_CORE FT
Vital Signs Last 24 Hrs  T(C): 35.9 (07-29-24 @ 06:15), Max: 36.7 (07-28-24 @ 21:34)  T(F): 96.7 (07-29-24 @ 06:15), Max: 98.1 (07-28-24 @ 21:34)  HR: --  BP: --  BP(mean): --  RR: 17 (07-29-24 @ 06:15) (17 - 17)  SpO2: --    Orthostatic VS  07-29-24 @ 06:15  Lying BP: --/-- HR: --  Sitting BP: 118/71 HR: 90  Standing BP: 127/80 HR: 101  Site: --  Mode: --

## 2024-07-29 NOTE — BH INPATIENT PSYCHIATRY PROGRESS NOTE - NSBHFUPINTERVALHXFT_PSY_A_CORE
Chart reviewed, including vital signs, medication administration record, lab results, and nursing notes.   Case discussed with nursing staff   - No acute events    Patient is seen in the group room under no acute distress and amenable to interview. Patient reports that he has been feeling better. He continues to be somewhat tangential on interview, but this is improving. Occasionally reports some paranoia and expresses fear being around the other patients. He denies any medication side effects. Reports stable sleep and appetite. Denies SI/HI/AVH.

## 2024-07-29 NOTE — BH INPATIENT PSYCHIATRY PROGRESS NOTE - NSBHMETABOLIC_PSY_ALL_CORE_FT
BMI:   HbA1c: A1C with Estimated Average Glucose Result: 4.9 % (07-22-24 @ 09:15)    Glucose:   BP: --Vital Signs Last 24 Hrs  T(C): 35.9 (07-29-24 @ 06:15), Max: 36.7 (07-28-24 @ 21:34)  T(F): 96.7 (07-29-24 @ 06:15), Max: 98.1 (07-28-24 @ 21:34)  HR: --  BP: --  BP(mean): --  RR: 17 (07-29-24 @ 06:15) (17 - 17)  SpO2: --    Orthostatic VS  07-29-24 @ 06:15  Lying BP: --/-- HR: --  Sitting BP: 118/71 HR: 90  Standing BP: 127/80 HR: 101  Site: --  Mode: --    Lipid Panel: Date/Time: 07-22-24 @ 09:15  Cholesterol, Serum: 167  LDL Cholesterol Calculated: 91  HDL Cholesterol, Serum: 67  Total Cholesterol/HDL Ration Measurement: --  Triglycerides, Serum: 45

## 2024-07-29 NOTE — BH INPATIENT PSYCHIATRY PROGRESS NOTE - CURRENT MEDICATION
MEDICATIONS  (STANDING):  LORazepam     Tablet 2 milliGRAM(s) Oral at bedtime  melatonin. 5 milliGRAM(s) Oral once  risperiDONE   Tablet 6 milliGRAM(s) Oral at bedtime  traZODone 50 milliGRAM(s) Oral at bedtime    MEDICATIONS  (PRN):  diphenhydrAMINE 50 milliGRAM(s) Oral every 6 hours PRN Rash and/or Itching/agitation/eps prophylaxis  diphenhydrAMINE Injectable 50 milliGRAM(s) IntraMuscular once PRN agitation/eps prophylaxis  haloperidol     Tablet 5 milliGRAM(s) Oral every 6 hours PRN agitation  haloperidol    Injectable 5 milliGRAM(s) IntraMuscular once PRN severe agitation  hydrOXYzine hydrochloride 50 milliGRAM(s) Oral every 4 hours PRN anxiety  LORazepam     Tablet 2 milliGRAM(s) Oral every 4 hours PRN anxiety/agitation  LORazepam   Injectable 2 milliGRAM(s) IntraMuscular once PRN severe anxiety/severe agitation

## 2024-07-30 PROCEDURE — 99232 SBSQ HOSP IP/OBS MODERATE 35: CPT

## 2024-07-30 PROCEDURE — 90853 GROUP PSYCHOTHERAPY: CPT

## 2024-07-30 RX ORDER — MELATONIN 3 MG
3 TABLET ORAL AT BEDTIME
Refills: 0 | Status: DISCONTINUED | OUTPATIENT
Start: 2024-07-30 | End: 2024-08-08

## 2024-07-30 RX ORDER — LORAZEPAM 1 MG/1
1 TABLET ORAL AT BEDTIME
Refills: 0 | Status: DISCONTINUED | OUTPATIENT
Start: 2024-07-30 | End: 2024-07-31

## 2024-07-30 RX ADMIN — LORAZEPAM 1 MILLIGRAM(S): 1 TABLET ORAL at 21:13

## 2024-07-30 RX ADMIN — Medication 3 MILLIGRAM(S): at 21:13

## 2024-07-30 RX ADMIN — RISPERIDONE 6 MILLIGRAM(S): 1 TABLET, FILM COATED ORAL at 21:13

## 2024-07-30 NOTE — BH INPATIENT PSYCHIATRY PROGRESS NOTE - CURRENT MEDICATION
MEDICATIONS  (STANDING):  LORazepam     Tablet 1 milliGRAM(s) Oral at bedtime  melatonin. 3 milliGRAM(s) Oral at bedtime  risperiDONE   Tablet 6 milliGRAM(s) Oral at bedtime    MEDICATIONS  (PRN):  diphenhydrAMINE 50 milliGRAM(s) Oral every 6 hours PRN Rash and/or Itching/agitation/eps prophylaxis  diphenhydrAMINE Injectable 50 milliGRAM(s) IntraMuscular once PRN agitation/eps prophylaxis  haloperidol     Tablet 5 milliGRAM(s) Oral every 6 hours PRN agitation  haloperidol    Injectable 5 milliGRAM(s) IntraMuscular once PRN severe agitation  hydrOXYzine hydrochloride 50 milliGRAM(s) Oral every 4 hours PRN anxiety  LORazepam     Tablet 2 milliGRAM(s) Oral every 4 hours PRN anxiety/agitation  LORazepam   Injectable 2 milliGRAM(s) IntraMuscular once PRN severe anxiety/severe agitation

## 2024-07-30 NOTE — BH INPATIENT PSYCHIATRY PROGRESS NOTE - NSBHCHARTREVIEWVS_PSY_A_CORE FT
Vital Signs Last 24 Hrs  T(C): 36.1 (07-30-24 @ 08:27), Max: 36.2 (07-29-24 @ 20:52)  T(F): 97 (07-30-24 @ 08:27), Max: 97.1 (07-29-24 @ 20:52)  HR: --  BP: --  BP(mean): --  RR: --  SpO2: --    Orthostatic VS  07-30-24 @ 08:27  Lying BP: --/-- HR: --  Sitting BP: 132/81 HR: 94  Standing BP: 120/73 HR: 103  Site: --  Mode: --  Orthostatic VS  07-29-24 @ 06:15  Lying BP: --/-- HR: --  Sitting BP: 118/71 HR: 90  Standing BP: 127/80 HR: 101  Site: --  Mode: --

## 2024-07-30 NOTE — BH INPATIENT PSYCHIATRY PROGRESS NOTE - NSBHFUPINTERVALHXFT_PSY_A_CORE
Chart reviewed, including vital signs, medication administration record, lab results, and nursing notes.   Case discussed with nursing staff   - No acute events    Patient is seen in the group room under no acute distress and amenable to interview. The patient continues to be hyperverbal and shows me the notes that he wrote in numerous notebooks and sheets of paper. States he is sleeping better now and report his mother that he has returned to baseline. Continues to request discharge. Reports stable sleep and appetite. Denies SI/HI/AVH. We discussed the plan to reduce the dose of Ativan today and discontinue trazodone per his request.

## 2024-07-30 NOTE — BH INPATIENT PSYCHIATRY PROGRESS NOTE - NSBHMETABOLIC_PSY_ALL_CORE_FT
BMI:   HbA1c: A1C with Estimated Average Glucose Result: 4.9 % (07-22-24 @ 09:15)    Glucose:   BP: --Vital Signs Last 24 Hrs  T(C): 36.1 (07-30-24 @ 08:27), Max: 36.2 (07-29-24 @ 20:52)  T(F): 97 (07-30-24 @ 08:27), Max: 97.1 (07-29-24 @ 20:52)  HR: --  BP: --  BP(mean): --  RR: --  SpO2: --    Orthostatic VS  07-30-24 @ 08:27  Lying BP: --/-- HR: --  Sitting BP: 132/81 HR: 94  Standing BP: 120/73 HR: 103  Site: --  Mode: --  Orthostatic VS  07-29-24 @ 06:15  Lying BP: --/-- HR: --  Sitting BP: 118/71 HR: 90  Standing BP: 127/80 HR: 101  Site: --  Mode: --    Lipid Panel: Date/Time: 07-22-24 @ 09:15  Cholesterol, Serum: 167  LDL Cholesterol Calculated: 91  HDL Cholesterol, Serum: 67  Total Cholesterol/HDL Ration Measurement: --  Triglycerides, Serum: 45

## 2024-07-31 PROCEDURE — 99232 SBSQ HOSP IP/OBS MODERATE 35: CPT

## 2024-07-31 PROCEDURE — 90853 GROUP PSYCHOTHERAPY: CPT

## 2024-07-31 RX ORDER — LORAZEPAM 1 MG/1
2 TABLET ORAL EVERY 4 HOURS
Refills: 0 | Status: DISCONTINUED | OUTPATIENT
Start: 2024-07-31 | End: 2024-08-07

## 2024-07-31 RX ORDER — LORAZEPAM 1 MG/1
1 TABLET ORAL AT BEDTIME
Refills: 0 | Status: DISCONTINUED | OUTPATIENT
Start: 2024-07-31 | End: 2024-08-01

## 2024-07-31 RX ORDER — LORAZEPAM 1 MG/1
2 TABLET ORAL ONCE
Refills: 0 | Status: DISCONTINUED | OUTPATIENT
Start: 2024-07-31 | End: 2024-08-07

## 2024-07-31 RX ADMIN — LORAZEPAM 1 MILLIGRAM(S): 1 TABLET ORAL at 21:00

## 2024-07-31 RX ADMIN — RISPERIDONE 6 MILLIGRAM(S): 1 TABLET, FILM COATED ORAL at 21:00

## 2024-07-31 RX ADMIN — Medication 3 MILLIGRAM(S): at 21:00

## 2024-07-31 NOTE — BH INPATIENT PSYCHIATRY PROGRESS NOTE - NSBHFUPINTERVALHXFT_PSY_A_CORE
Chart reviewed, including vital signs, medication administration record, lab results, and nursing notes.   Case discussed with nursing staff   - No acute events    Patient is seen in the group room under no acute distress and amenable to interview. The patient appears calmer today and less pressured compared to previous evaluations. He describes experiencing some itching on his eyes and is concerned that he might have a stye. Agrees to use warm compresses to help with this. He has been in touch with his mother. Agrees with the plan to reduce the dose of Ativan prior to his discharge. Patient reports stable sleep and appetite. Denies SI/HI/AVH. Reports no medication side effects.

## 2024-07-31 NOTE — BH INPATIENT PSYCHIATRY PROGRESS NOTE - NSBHMETABOLIC_PSY_ALL_CORE_FT
BMI:   HbA1c: A1C with Estimated Average Glucose Result: 4.9 % (07-22-24 @ 09:15)    Glucose:   BP: --Vital Signs Last 24 Hrs  T(C): 36.1 (07-31-24 @ 06:30), Max: 36.8 (07-30-24 @ 20:53)  T(F): 97 (07-31-24 @ 06:30), Max: 98.2 (07-30-24 @ 20:53)  HR: --  BP: --  BP(mean): --  RR: --  SpO2: --    Orthostatic VS  07-31-24 @ 06:30  Lying BP: --/-- HR: --  Sitting BP: 132/74 HR: 91  Standing BP: 121/72 HR: 101  Site: --  Mode: --  Orthostatic VS  07-30-24 @ 08:27  Lying BP: --/-- HR: --  Sitting BP: 132/81 HR: 94  Standing BP: 120/73 HR: 103  Site: --  Mode: --    Lipid Panel: Date/Time: 07-22-24 @ 09:15  Cholesterol, Serum: 167  LDL Cholesterol Calculated: 91  HDL Cholesterol, Serum: 67  Total Cholesterol/HDL Ration Measurement: --  Triglycerides, Serum: 45

## 2024-07-31 NOTE — BH INPATIENT PSYCHIATRY PROGRESS NOTE - NSBHCHARTREVIEWVS_PSY_A_CORE FT
Vital Signs Last 24 Hrs  T(C): 36.1 (07-31-24 @ 06:30), Max: 36.8 (07-30-24 @ 20:53)  T(F): 97 (07-31-24 @ 06:30), Max: 98.2 (07-30-24 @ 20:53)  HR: --  BP: --  BP(mean): --  RR: --  SpO2: --    Orthostatic VS  07-31-24 @ 06:30  Lying BP: --/-- HR: --  Sitting BP: 132/74 HR: 91  Standing BP: 121/72 HR: 101  Site: --  Mode: --  Orthostatic VS  07-30-24 @ 08:27  Lying BP: --/-- HR: --  Sitting BP: 132/81 HR: 94  Standing BP: 120/73 HR: 103  Site: --  Mode: --

## 2024-08-01 PROCEDURE — 90853 GROUP PSYCHOTHERAPY: CPT

## 2024-08-01 PROCEDURE — 99232 SBSQ HOSP IP/OBS MODERATE 35: CPT

## 2024-08-01 RX ORDER — HYDROXYZINE HCL 50 MG/ML
25 VIAL (ML) INTRAMUSCULAR EVERY 6 HOURS
Refills: 0 | Status: DISCONTINUED | OUTPATIENT
Start: 2024-08-01 | End: 2024-08-01

## 2024-08-01 RX ORDER — HYDROXYZINE HCL 50 MG/ML
25 VIAL (ML) INTRAMUSCULAR EVERY 6 HOURS
Refills: 0 | Status: DISCONTINUED | OUTPATIENT
Start: 2024-08-01 | End: 2024-08-08

## 2024-08-01 RX ADMIN — RISPERIDONE 6 MILLIGRAM(S): 1 TABLET, FILM COATED ORAL at 21:28

## 2024-08-01 RX ADMIN — Medication 25 MILLIGRAM(S): at 21:27

## 2024-08-01 RX ADMIN — Medication 3 MILLIGRAM(S): at 21:28

## 2024-08-01 RX ADMIN — Medication 50 MILLIGRAM(S): at 13:13

## 2024-08-01 NOTE — BH PSYCHOLOGY - GROUP THERAPY NOTE - NSPSYCHOLGRPCOGINT_PSY_A_CORE FT
[employer] is an ACT-based group in which patients learn skills and explore themes of identity, compassion, resilience, and connection through the lens of marginalized identity. Group begins with setting group expectations and introductions that focus on identity exploration. Following introductions, the daily theme is presented through both didactics and experiential activities. Group today focused on the theme “identity.” Patients were provided psychoeducation about identity and engaged in discussion about types of identity and the importance of values on identity formation.  provided experiential activities that allowed patients to engage in their own values exploration, determining the unique values that help to form their identities. 
[employer] is an ACT-based group in which patients learn skills and explore themes of identity, compassion, resilience, and connection through the lens of marginalized identity. Group begins with setting group expectations and introductions that focus on identity exploration. Following introductions, the daily theme is presented through both didactics and experiential activities. Group today focused on the theme “connection.” Patients were provided psychoeducation about connection and engaged in discussion about the importance of developing healthy relationships.  led patients in an experiential exercise titled “Taking a Relationship Inventory” and helped patients to connect their values to their relationships.

## 2024-08-01 NOTE — BH INPATIENT PSYCHIATRY PROGRESS NOTE - NSBHFUPINTERVALHXFT_PSY_A_CORE
Chart reviewed, including vital signs, medication administration record, lab results, and nursing notes.   Case discussed with nursing staff   - No acute events    Patient is seen in the group room under no acute distress and amenable to interview. Patient continues to be discharge focused, pressured, and tangential. States that he is having a “panic attack” due to his prolonged hospitalization. He expresses his desire to be discharged so that he can play soccer and see his family and pets. States that he has been “drinking cold water” to stay calm. He demands to have a family meeting, so his family can express their desire for him to be discharged as well. We discussed the plan to reduce the dose of Ativan today. Patient reports stable sleep and appetite. Denies SI/HI/AVH. Reports no medication side effects.

## 2024-08-01 NOTE — BH INPATIENT PSYCHIATRY PROGRESS NOTE - NSBHCHARTREVIEWVS_PSY_A_CORE FT
Vital Signs Last 24 Hrs  T(C): 35.8 (08-01-24 @ 06:07), Max: 36.3 (07-31-24 @ 20:33)  T(F): 96.5 (08-01-24 @ 06:07), Max: 97.3 (07-31-24 @ 20:33)  HR: --  BP: --  BP(mean): --  RR: 18 (08-01-24 @ 06:07) (18 - 18)  SpO2: --    Orthostatic VS  08-01-24 @ 06:07  Lying BP: --/-- HR: --  Sitting BP: 129/80 HR: 81  Standing BP: 128/69 HR: 87  Site: --  Mode: --  Orthostatic VS  07-31-24 @ 06:30  Lying BP: --/-- HR: --  Sitting BP: 132/74 HR: 91  Standing BP: 121/72 HR: 101  Site: --  Mode: --

## 2024-08-01 NOTE — BH INPATIENT PSYCHIATRY PROGRESS NOTE - NSBHMETABOLIC_PSY_ALL_CORE_FT
BMI:   HbA1c: A1C with Estimated Average Glucose Result: 4.9 % (07-22-24 @ 09:15)    Glucose:   BP: --Vital Signs Last 24 Hrs  T(C): 35.8 (08-01-24 @ 06:07), Max: 36.3 (07-31-24 @ 20:33)  T(F): 96.5 (08-01-24 @ 06:07), Max: 97.3 (07-31-24 @ 20:33)  HR: --  BP: --  BP(mean): --  RR: 18 (08-01-24 @ 06:07) (18 - 18)  SpO2: --    Orthostatic VS  08-01-24 @ 06:07  Lying BP: --/-- HR: --  Sitting BP: 129/80 HR: 81  Standing BP: 128/69 HR: 87  Site: --  Mode: --  Orthostatic VS  07-31-24 @ 06:30  Lying BP: --/-- HR: --  Sitting BP: 132/74 HR: 91  Standing BP: 121/72 HR: 101  Site: --  Mode: --    Lipid Panel: Date/Time: 07-22-24 @ 09:15  Cholesterol, Serum: 167  LDL Cholesterol Calculated: 91  HDL Cholesterol, Serum: 67  Total Cholesterol/HDL Ration Measurement: --  Triglycerides, Serum: 45

## 2024-08-01 NOTE — BH PSYCHOLOGY - GROUP THERAPY NOTE - NSPSYCHOLGRPCOGAFCT_PSY_A_CORE FT
Review of Systems  •	CONSTITUTIONAL - no  fever, no diaphoresis, no weight change  •	SKIN - no rash  •	HEMATOLOGIC - no bleeding, no bruising  •	EYES - no eye pain, no blurred vision  •	ENT - no change in hearing, no pain  •	RESPIRATORY - no shortness of breath, no cough  •	CARDIAC - no chest pain, no palpitations  •	GI - no abd pain, no nausea, no vomiting, no diarrhea, no constipation, no bleeding  •	GENITO-URINARY - no discharge, no dysuria; no hematuria,   •	ENDO - no polydipsia, no polyuria, no heat/no cold intolerance  •	MUSCULOSKELETAL - no joint pain, no swelling, no redness  •	NEUROLOGIC - no weakness, no headache, no anesthesia, no paresthesias  •	PSYCH - no anxiety, non suicidal, non homicidal, no hallucination, no depression
WNL
WNL

## 2024-08-01 NOTE — BH INPATIENT PSYCHIATRY PROGRESS NOTE - CURRENT MEDICATION
MEDICATIONS  (STANDING):  melatonin. 3 milliGRAM(s) Oral at bedtime  risperiDONE   Tablet 6 milliGRAM(s) Oral at bedtime    MEDICATIONS  (PRN):  diphenhydrAMINE 50 milliGRAM(s) Oral every 6 hours PRN Rash and/or Itching/agitation/eps prophylaxis  diphenhydrAMINE Injectable 50 milliGRAM(s) IntraMuscular once PRN agitation/eps prophylaxis  haloperidol     Tablet 5 milliGRAM(s) Oral every 6 hours PRN agitation  haloperidol    Injectable 5 milliGRAM(s) IntraMuscular once PRN severe agitation  hydrOXYzine hydrochloride 50 milliGRAM(s) Oral every 4 hours PRN anxiety  LORazepam     Tablet 2 milliGRAM(s) Oral every 4 hours PRN anxiety/agitation  LORazepam   Injectable 2 milliGRAM(s) IntraMuscular once PRN severe anxiety/severe agitation

## 2024-08-02 PROCEDURE — 99232 SBSQ HOSP IP/OBS MODERATE 35: CPT

## 2024-08-02 RX ORDER — DIVALPROEX SODIUM 125 MG/1
750 CAPSULE, COATED PELLETS ORAL AT BEDTIME
Refills: 0 | Status: DISCONTINUED | OUTPATIENT
Start: 2024-08-02 | End: 2024-08-02

## 2024-08-02 RX ORDER — IBUPROFEN 200 MG
400 TABLET ORAL EVERY 6 HOURS
Refills: 0 | Status: DISCONTINUED | OUTPATIENT
Start: 2024-08-02 | End: 2024-08-08

## 2024-08-02 RX ORDER — DIVALPROEX SODIUM 125 MG/1
1000 CAPSULE, COATED PELLETS ORAL AT BEDTIME
Refills: 0 | Status: DISCONTINUED | OUTPATIENT
Start: 2024-08-02 | End: 2024-08-05

## 2024-08-02 RX ADMIN — LORAZEPAM 2 MILLIGRAM(S): 1 TABLET ORAL at 20:43

## 2024-08-02 RX ADMIN — Medication 25 MILLIGRAM(S): at 08:10

## 2024-08-02 RX ADMIN — Medication 3 MILLIGRAM(S): at 20:44

## 2024-08-02 RX ADMIN — RISPERIDONE 6 MILLIGRAM(S): 1 TABLET, FILM COATED ORAL at 20:43

## 2024-08-02 RX ADMIN — DIVALPROEX SODIUM 1000 MILLIGRAM(S): 125 CAPSULE, COATED PELLETS ORAL at 20:43

## 2024-08-02 RX ADMIN — LORAZEPAM 2 MILLIGRAM(S): 1 TABLET ORAL at 10:16

## 2024-08-02 NOTE — BH INPATIENT PSYCHIATRY PROGRESS NOTE - NSBHMETABOLIC_PSY_ALL_CORE_FT
BMI:   HbA1c: A1C with Estimated Average Glucose Result: 4.9 % (07-22-24 @ 09:15)    Glucose:   BP: --Vital Signs Last 24 Hrs  T(C): 36.6 (08-02-24 @ 08:14), Max: 37 (08-01-24 @ 20:39)  T(F): 97.9 (08-02-24 @ 08:14), Max: 98.6 (08-01-24 @ 20:39)  HR: --  BP: --  BP(mean): --  RR: 18 (08-02-24 @ 08:14) (18 - 18)  SpO2: --    Orthostatic VS  08-02-24 @ 08:14  Lying BP: --/-- HR: --  Sitting BP: 132/70 HR: 93  Standing BP: 140/81 HR: 100  Site: --  Mode: --  Orthostatic VS  08-01-24 @ 06:07  Lying BP: --/-- HR: --  Sitting BP: 129/80 HR: 81  Standing BP: 128/69 HR: 87  Site: --  Mode: --    Lipid Panel: Date/Time: 07-22-24 @ 09:15  Cholesterol, Serum: 167  LDL Cholesterol Calculated: 91  HDL Cholesterol, Serum: 67  Total Cholesterol/HDL Ration Measurement: --  Triglycerides, Serum: 45   Ms. 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 Ms. Chen is a 75 year old F with history of HLD (not on med currently), DVT (recurrent, a few years ago with coumadin x6 months, left sided, now with right DVT on eliquis, but recently told at [facility] to discontinue eliquis?), invasive urothelial carcinoma both conventionally and focally sarcomatoid subtype  and high grade carcinoma invaded muscularis propria and well differentiated adeno colon cancer T4 N1 Mo, stage III, MSI intact), s/p TURBT who presents for admission with dislodged left nephrostomy tube.

## 2024-08-02 NOTE — BH INPATIENT PSYCHIATRY PROGRESS NOTE - PRN MEDS
MEDICATIONS  (PRN):  diphenhydrAMINE 50 milliGRAM(s) Oral every 6 hours PRN Rash and/or Itching/agitation/eps prophylaxis  diphenhydrAMINE Injectable 50 milliGRAM(s) IntraMuscular once PRN agitation/eps prophylaxis  haloperidol     Tablet 5 milliGRAM(s) Oral every 6 hours PRN agitation  haloperidol    Injectable 5 milliGRAM(s) IntraMuscular once PRN severe agitation  hydrOXYzine hydrochloride 25 milliGRAM(s) Oral every 6 hours PRN anxiety  ibuprofen  Tablet. 400 milliGRAM(s) Oral every 6 hours PRN Moderate Pain (4 - 6)  LORazepam     Tablet 2 milliGRAM(s) Oral every 4 hours PRN anxiety/agitation  LORazepam   Injectable 2 milliGRAM(s) IntraMuscular once PRN severe anxiety/severe agitation   Complex Repair And W Plasty Text: The defect edges were debeveled with a #15 scalpel blade.  The primary defect was closed partially with a complex linear closure.  Given the location of the remaining defect, shape of the defect and the proximity to free margins a W plasty was deemed most appropriate for complete closure of the defect.  Using a sterile surgical marker, an appropriate advancement flap was drawn incorporating the defect and placing the expected incisions within the relaxed skin tension lines where possible.    The area thus outlined was incised deep to adipose tissue with a #15 scalpel blade.  The skin margins were undermined to an appropriate distance in all directions utilizing iris scissors.

## 2024-08-02 NOTE — BH INPATIENT PSYCHIATRY PROGRESS NOTE - NSBHFUPINTERVALHXFT_PSY_A_CORE
Chart reviewed, including vital signs, medication administration record, lab results, and nursing notes.   Case discussed with nursing staff   - No acute events    Patient is seen in the group room under no acute distress and amenable to interview. The patient exhibited symptoms of tc today. 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 He demands a family meeting to discuss discharge. At first, he reports feeling “like a caged animal” in the hospital. But subsequently, he states that “[address] [place] [address] is way better than [place]” where he was previously hospitalized. The patient exhibits mood lability, becoming tearful in the middle of the interview. He reports that his continued hospitalization is making him more anxious. Reports stable sleep and appetite. Denies SI/HI/AVH. We discussed the plan to start the mood stabilizer Depakote.

## 2024-08-02 NOTE — BH INPATIENT PSYCHIATRY PROGRESS NOTE - NSBHCHARTREVIEWVS_PSY_A_CORE FT
Vital Signs Last 24 Hrs  T(C): 36.6 (08-02-24 @ 08:14), Max: 37 (08-01-24 @ 20:39)  T(F): 97.9 (08-02-24 @ 08:14), Max: 98.6 (08-01-24 @ 20:39)  HR: --  BP: --  BP(mean): --  RR: 18 (08-02-24 @ 08:14) (18 - 18)  SpO2: --    Orthostatic VS  08-02-24 @ 08:14  Lying BP: --/-- HR: --  Sitting BP: 132/70 HR: 93  Standing BP: 140/81 HR: 100  Site: --  Mode: --  Orthostatic VS  08-01-24 @ 06:07  Lying BP: --/-- HR: --  Sitting BP: 129/80 HR: 81  Standing BP: 128/69 HR: 87  Site: --  Mode: --

## 2024-08-02 NOTE — BH INPATIENT PSYCHIATRY PROGRESS NOTE - CURRENT MEDICATION
MEDICATIONS  (STANDING):  divalproex ER 1000 milliGRAM(s) Oral at bedtime  melatonin. 3 milliGRAM(s) Oral at bedtime  risperiDONE   Tablet 6 milliGRAM(s) Oral at bedtime    MEDICATIONS  (PRN):  diphenhydrAMINE 50 milliGRAM(s) Oral every 6 hours PRN Rash and/or Itching/agitation/eps prophylaxis  diphenhydrAMINE Injectable 50 milliGRAM(s) IntraMuscular once PRN agitation/eps prophylaxis  haloperidol     Tablet 5 milliGRAM(s) Oral every 6 hours PRN agitation  haloperidol    Injectable 5 milliGRAM(s) IntraMuscular once PRN severe agitation  hydrOXYzine hydrochloride 25 milliGRAM(s) Oral every 6 hours PRN anxiety  ibuprofen  Tablet. 400 milliGRAM(s) Oral every 6 hours PRN Moderate Pain (4 - 6)  LORazepam     Tablet 2 milliGRAM(s) Oral every 4 hours PRN anxiety/agitation  LORazepam   Injectable 2 milliGRAM(s) IntraMuscular once PRN severe anxiety/severe agitation

## 2024-08-03 PROCEDURE — 99232 SBSQ HOSP IP/OBS MODERATE 35: CPT

## 2024-08-03 RX ORDER — OXYMETAZOLINE HCL 0.05 %
2 SPRAY, NON-AEROSOL (ML) NASAL
Refills: 0 | Status: DISCONTINUED | OUTPATIENT
Start: 2024-08-03 | End: 2024-08-07

## 2024-08-03 RX ADMIN — Medication 25 MILLIGRAM(S): at 21:30

## 2024-08-03 RX ADMIN — Medication 2 SPRAY(S): at 22:24

## 2024-08-03 RX ADMIN — RISPERIDONE 6 MILLIGRAM(S): 1 TABLET, FILM COATED ORAL at 20:30

## 2024-08-03 RX ADMIN — Medication 3 MILLIGRAM(S): at 20:31

## 2024-08-03 RX ADMIN — Medication 25 MILLIGRAM(S): at 13:35

## 2024-08-03 RX ADMIN — DIVALPROEX SODIUM 1000 MILLIGRAM(S): 125 CAPSULE, COATED PELLETS ORAL at 20:31

## 2024-08-03 NOTE — BH INPATIENT PSYCHIATRY PROGRESS NOTE - NSBHMETABOLIC_PSY_ALL_CORE_FT
BMI:   HbA1c: A1C with Estimated Average Glucose Result: 4.9 % (07-22-24 @ 09:15)    Glucose:   BP: --Vital Signs Last 24 Hrs  T(C): 37.4 (08-03-24 @ 20:29), Max: 37.4 (08-03-24 @ 20:29)  T(F): 99.4 (08-03-24 @ 20:29), Max: 99.4 (08-03-24 @ 20:29)  HR: --  BP: --  BP(mean): --  RR: --  SpO2: --    Orthostatic VS  08-03-24 @ 07:54  Lying BP: --/-- HR: --  Sitting BP: 125/68 HR: 89  Standing BP: 123/63 HR: 120  Site: --  Mode: --  Orthostatic VS  08-02-24 @ 08:14  Lying BP: --/-- HR: --  Sitting BP: 132/70 HR: 93  Standing BP: 140/81 HR: 100  Site: --  Mode: --    Lipid Panel: Date/Time: 07-22-24 @ 09:15  Cholesterol, Serum: 167  LDL Cholesterol Calculated: 91  HDL Cholesterol, Serum: 67  Total Cholesterol/HDL Ration Measurement: --  Triglycerides, Serum: 45

## 2024-08-03 NOTE — BH INPATIENT PSYCHIATRY PROGRESS NOTE - NSBHFUPINTERVALHXFT_PSY_A_CORE
Chart reviewed, including vital signs, medication administration record, lab results, and nursing notes.   Case discussed with nursing staff   - No acute events    Patient is seen in the group room under no acute distress and amenable to interview. The patient exhibited symptoms of tc today. 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 He demands a family meeting to discuss discharge. At first, he reports feeling “like a caged animal” in the hospital. But subsequently, he states that “[address] [place] [address] is way better than [place]” where he was previously hospitalized. The patient exhibits mood lability, becoming tearful in the middle of the interview. He reports that his continued hospitalization is making him more anxious. Reports stable sleep and appetite. Denies SI/HI/AVH. We discussed the plan to start the mood stabilizer Depakote. Chart reviewed, including vital signs, medication administration record, lab results, and nursing notes.   Case discussed with nursing staff   Patient says that he just feels more depressed because he was not able to go home. 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 He reports feeling “like a caged animal” in the hospital.  He reports that his continued hospitalization is making him more anxious. Reports stable sleep and appetite. Denies SI/HI/AVH. The plan is to start the mood stabilizer Depakote.

## 2024-08-03 NOTE — BH INPATIENT PSYCHIATRY PROGRESS NOTE - CURRENT MEDICATION
MEDICATIONS  (STANDING):  divalproex ER 1000 milliGRAM(s) Oral at bedtime  melatonin. 3 milliGRAM(s) Oral at bedtime  risperiDONE   Tablet 6 milliGRAM(s) Oral at bedtime    MEDICATIONS  (PRN):  diphenhydrAMINE 50 milliGRAM(s) Oral every 6 hours PRN Rash and/or Itching/agitation/eps prophylaxis  diphenhydrAMINE Injectable 50 milliGRAM(s) IntraMuscular once PRN agitation/eps prophylaxis  haloperidol     Tablet 5 milliGRAM(s) Oral every 6 hours PRN agitation  haloperidol    Injectable 5 milliGRAM(s) IntraMuscular once PRN severe agitation  hydrOXYzine hydrochloride 25 milliGRAM(s) Oral every 6 hours PRN anxiety  ibuprofen  Tablet. 400 milliGRAM(s) Oral every 6 hours PRN Moderate Pain (4 - 6)  LORazepam     Tablet 2 milliGRAM(s) Oral every 4 hours PRN anxiety/agitation  LORazepam   Injectable 2 milliGRAM(s) IntraMuscular once PRN severe anxiety/severe agitation   MEDICATIONS  (STANDING):  divalproex ER 1000 milliGRAM(s) Oral at bedtime  melatonin. 3 milliGRAM(s) Oral at bedtime  risperiDONE   Tablet 6 milliGRAM(s) Oral at bedtime    MEDICATIONS  (PRN):  diphenhydrAMINE 50 milliGRAM(s) Oral every 6 hours PRN Rash and/or Itching/agitation/eps prophylaxis  diphenhydrAMINE Injectable 50 milliGRAM(s) IntraMuscular once PRN agitation/eps prophylaxis  haloperidol     Tablet 5 milliGRAM(s) Oral every 6 hours PRN agitation  haloperidol    Injectable 5 milliGRAM(s) IntraMuscular once PRN severe agitation  hydrOXYzine hydrochloride 25 milliGRAM(s) Oral every 6 hours PRN anxiety  ibuprofen  Tablet. 400 milliGRAM(s) Oral every 6 hours PRN Moderate Pain (4 - 6)  LORazepam     Tablet 2 milliGRAM(s) Oral every 4 hours PRN anxiety/agitation  LORazepam   Injectable 2 milliGRAM(s) IntraMuscular once PRN severe anxiety/severe agitation  oxymetazoline 0.05% Nasal Spray 2 Spray(s) Both Nostrils two times a day PRN nasal congestion

## 2024-08-03 NOTE — BH INPATIENT PSYCHIATRY PROGRESS NOTE - NSBHCHARTREVIEWVS_PSY_A_CORE FT
Vital Signs Last 24 Hrs  T(C): 37.4 (08-03-24 @ 20:29), Max: 37.4 (08-03-24 @ 20:29)  T(F): 99.4 (08-03-24 @ 20:29), Max: 99.4 (08-03-24 @ 20:29)  HR: --  BP: --  BP(mean): --  RR: --  SpO2: --    Orthostatic VS  08-03-24 @ 07:54  Lying BP: --/-- HR: --  Sitting BP: 125/68 HR: 89  Standing BP: 123/63 HR: 120  Site: --  Mode: --  Orthostatic VS  08-02-24 @ 08:14  Lying BP: --/-- HR: --  Sitting BP: 132/70 HR: 93  Standing BP: 140/81 HR: 100  Site: --  Mode: --

## 2024-08-03 NOTE — BH INPATIENT PSYCHIATRY PROGRESS NOTE - PRN MEDS
MEDICATIONS  (PRN):  diphenhydrAMINE 50 milliGRAM(s) Oral every 6 hours PRN Rash and/or Itching/agitation/eps prophylaxis  diphenhydrAMINE Injectable 50 milliGRAM(s) IntraMuscular once PRN agitation/eps prophylaxis  haloperidol     Tablet 5 milliGRAM(s) Oral every 6 hours PRN agitation  haloperidol    Injectable 5 milliGRAM(s) IntraMuscular once PRN severe agitation  hydrOXYzine hydrochloride 25 milliGRAM(s) Oral every 6 hours PRN anxiety  ibuprofen  Tablet. 400 milliGRAM(s) Oral every 6 hours PRN Moderate Pain (4 - 6)  LORazepam     Tablet 2 milliGRAM(s) Oral every 4 hours PRN anxiety/agitation  LORazepam   Injectable 2 milliGRAM(s) IntraMuscular once PRN severe anxiety/severe agitation   MEDICATIONS  (PRN):  diphenhydrAMINE 50 milliGRAM(s) Oral every 6 hours PRN Rash and/or Itching/agitation/eps prophylaxis  diphenhydrAMINE Injectable 50 milliGRAM(s) IntraMuscular once PRN agitation/eps prophylaxis  haloperidol     Tablet 5 milliGRAM(s) Oral every 6 hours PRN agitation  haloperidol    Injectable 5 milliGRAM(s) IntraMuscular once PRN severe agitation  hydrOXYzine hydrochloride 25 milliGRAM(s) Oral every 6 hours PRN anxiety  ibuprofen  Tablet. 400 milliGRAM(s) Oral every 6 hours PRN Moderate Pain (4 - 6)  LORazepam     Tablet 2 milliGRAM(s) Oral every 4 hours PRN anxiety/agitation  LORazepam   Injectable 2 milliGRAM(s) IntraMuscular once PRN severe anxiety/severe agitation  oxymetazoline 0.05% Nasal Spray 2 Spray(s) Both Nostrils two times a day PRN nasal congestion

## 2024-08-04 PROCEDURE — 99232 SBSQ HOSP IP/OBS MODERATE 35: CPT

## 2024-08-04 RX ADMIN — DIVALPROEX SODIUM 1000 MILLIGRAM(S): 125 CAPSULE, COATED PELLETS ORAL at 20:36

## 2024-08-04 RX ADMIN — Medication 25 MILLIGRAM(S): at 17:03

## 2024-08-04 RX ADMIN — Medication 25 MILLIGRAM(S): at 10:22

## 2024-08-04 RX ADMIN — Medication 3 MILLIGRAM(S): at 20:36

## 2024-08-04 RX ADMIN — RISPERIDONE 6 MILLIGRAM(S): 1 TABLET, FILM COATED ORAL at 20:36

## 2024-08-04 NOTE — BH INPATIENT PSYCHIATRY PROGRESS NOTE - NSBHMETABOLIC_PSY_ALL_CORE_FT
BMI:   HbA1c: A1C with Estimated Average Glucose Result: 4.9 % (07-22-24 @ 09:15)    Glucose:   BP: --Vital Signs Last 24 Hrs  T(C): 36.7 (08-04-24 @ 07:48), Max: 37.4 (08-03-24 @ 20:29)  T(F): 98.1 (08-04-24 @ 07:48), Max: 99.4 (08-03-24 @ 20:29)  HR: --  BP: --  BP(mean): --  RR: 16 (08-04-24 @ 07:48) (16 - 16)  SpO2: --    Orthostatic VS  08-04-24 @ 07:48  Lying BP: --/-- HR: --  Sitting BP: 136/66 HR: 89  Standing BP: 110/61 HR: 100  Site: --  Mode: --  Orthostatic VS  08-03-24 @ 07:54  Lying BP: --/-- HR: --  Sitting BP: 125/68 HR: 89  Standing BP: 123/63 HR: 120  Site: --  Mode: --    Lipid Panel: Date/Time: 07-22-24 @ 09:15  Cholesterol, Serum: 167  LDL Cholesterol Calculated: 91  HDL Cholesterol, Serum: 67  Total Cholesterol/HDL Ration Measurement: --  Triglycerides, Serum: 45

## 2024-08-04 NOTE — BH INPATIENT PSYCHIATRY PROGRESS NOTE - CURRENT MEDICATION
MEDICATIONS  (STANDING):  divalproex ER 1000 milliGRAM(s) Oral at bedtime  melatonin. 3 milliGRAM(s) Oral at bedtime  risperiDONE   Tablet 6 milliGRAM(s) Oral at bedtime    MEDICATIONS  (PRN):  diphenhydrAMINE 50 milliGRAM(s) Oral every 6 hours PRN Rash and/or Itching/agitation/eps prophylaxis  diphenhydrAMINE Injectable 50 milliGRAM(s) IntraMuscular once PRN agitation/eps prophylaxis  haloperidol     Tablet 5 milliGRAM(s) Oral every 6 hours PRN agitation  haloperidol    Injectable 5 milliGRAM(s) IntraMuscular once PRN severe agitation  hydrOXYzine hydrochloride 25 milliGRAM(s) Oral every 6 hours PRN anxiety  ibuprofen  Tablet. 400 milliGRAM(s) Oral every 6 hours PRN Moderate Pain (4 - 6)  LORazepam     Tablet 2 milliGRAM(s) Oral every 4 hours PRN anxiety/agitation  LORazepam   Injectable 2 milliGRAM(s) IntraMuscular once PRN severe anxiety/severe agitation  oxymetazoline 0.05% Nasal Spray 2 Spray(s) Both Nostrils two times a day PRN nasal congestion

## 2024-08-04 NOTE — BH INPATIENT PSYCHIATRY PROGRESS NOTE - NSBHCHARTREVIEWVS_PSY_A_CORE FT
Vital Signs Last 24 Hrs  T(C): 36.7 (08-04-24 @ 07:48), Max: 37.4 (08-03-24 @ 20:29)  T(F): 98.1 (08-04-24 @ 07:48), Max: 99.4 (08-03-24 @ 20:29)  HR: --  BP: --  BP(mean): --  RR: 16 (08-04-24 @ 07:48) (16 - 16)  SpO2: --    Orthostatic VS  08-04-24 @ 07:48  Lying BP: --/-- HR: --  Sitting BP: 136/66 HR: 89  Standing BP: 110/61 HR: 100  Site: --  Mode: --  Orthostatic VS  08-03-24 @ 07:54  Lying BP: --/-- HR: --  Sitting BP: 125/68 HR: 89  Standing BP: 123/63 HR: 120  Site: --  Mode: --

## 2024-08-04 NOTE — BH INPATIENT PSYCHIATRY PROGRESS NOTE - NSBHMSESPABN_PSY_A_CORE
Pressured rate/Increased productivity

## 2024-08-04 NOTE — BH INPATIENT PSYCHIATRY PROGRESS NOTE - NSBHFUPINTERVALHXFT_PSY_A_CORE
Chart reviewed, including vital signs, medication administration record, lab results, and nursing notes.   Case discussed with nursing staff   Patient says that he just feels more depressed because he was not able to go home and he also missed his dad very much. He talked about his dad's death and about how he was diagnosed with gastric cancer and  in three weeks in Oct 2023. He says he does feel good being with his family and they are supportive of him.   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 Reports stable sleep and appetite. Denies SI/HI/AVH. The plan is to start the mood stabilizer Depakote.

## 2024-08-05 LAB
ALBUMIN SERPL ELPH-MCNC: 4.4 G/DL — SIGNIFICANT CHANGE UP (ref 3.3–5)
ALP SERPL-CCNC: 73 U/L — SIGNIFICANT CHANGE UP (ref 40–120)
ALT FLD-CCNC: 49 U/L — HIGH (ref 4–41)
ANION GAP SERPL CALC-SCNC: 12 MMOL/L — SIGNIFICANT CHANGE UP (ref 7–14)
AST SERPL-CCNC: 26 U/L — SIGNIFICANT CHANGE UP (ref 4–40)
BILIRUB SERPL-MCNC: 0.4 MG/DL — SIGNIFICANT CHANGE UP (ref 0.2–1.2)
BUN SERPL-MCNC: 14 MG/DL — SIGNIFICANT CHANGE UP (ref 7–23)
CALCIUM SERPL-MCNC: 9.6 MG/DL — SIGNIFICANT CHANGE UP (ref 8.4–10.5)
CHLORIDE SERPL-SCNC: 101 MMOL/L — SIGNIFICANT CHANGE UP (ref 98–107)
CO2 SERPL-SCNC: 27 MMOL/L — SIGNIFICANT CHANGE UP (ref 22–31)
CREAT SERPL-MCNC: 0.85 MG/DL — SIGNIFICANT CHANGE UP (ref 0.5–1.3)
EGFR: 125 ML/MIN/1.73M2 — SIGNIFICANT CHANGE UP
GLUCOSE SERPL-MCNC: 81 MG/DL — SIGNIFICANT CHANGE UP (ref 70–99)
POTASSIUM SERPL-MCNC: 4.3 MMOL/L — SIGNIFICANT CHANGE UP (ref 3.5–5.3)
POTASSIUM SERPL-SCNC: 4.3 MMOL/L — SIGNIFICANT CHANGE UP (ref 3.5–5.3)
PROT SERPL-MCNC: 6.7 G/DL — SIGNIFICANT CHANGE UP (ref 6–8.3)
SODIUM SERPL-SCNC: 140 MMOL/L — SIGNIFICANT CHANGE UP (ref 135–145)
VALPROATE SERPL-MCNC: 34.5 UG/ML — LOW (ref 50–100)

## 2024-08-05 PROCEDURE — 99232 SBSQ HOSP IP/OBS MODERATE 35: CPT

## 2024-08-05 PROCEDURE — 90853 GROUP PSYCHOTHERAPY: CPT

## 2024-08-05 RX ORDER — DIVALPROEX SODIUM 125 MG/1
1500 CAPSULE, COATED PELLETS ORAL AT BEDTIME
Refills: 0 | Status: DISCONTINUED | OUTPATIENT
Start: 2024-08-05 | End: 2024-08-08

## 2024-08-05 RX ADMIN — Medication 3 MILLIGRAM(S): at 20:51

## 2024-08-05 RX ADMIN — Medication 25 MILLIGRAM(S): at 12:36

## 2024-08-05 RX ADMIN — DIVALPROEX SODIUM 1500 MILLIGRAM(S): 125 CAPSULE, COATED PELLETS ORAL at 20:51

## 2024-08-05 RX ADMIN — RISPERIDONE 6 MILLIGRAM(S): 1 TABLET, FILM COATED ORAL at 20:51

## 2024-08-05 NOTE — BH INPATIENT PSYCHIATRY PROGRESS NOTE - NSBHCHARTREVIEWVS_PSY_A_CORE FT
Vital Signs Last 24 Hrs  T(C): 36.3 (08-05-24 @ 06:19), Max: 36.7 (08-04-24 @ 20:34)  T(F): 97.3 (08-05-24 @ 06:19), Max: 98.1 (08-04-24 @ 20:34)  HR: --  BP: --  BP(mean): --  RR: --  SpO2: --    Orthostatic VS  08-05-24 @ 06:19  Lying BP: --/-- HR: --  Sitting BP: 100/78 HR: 92  Standing BP: 118/65 HR: 101  Site: --  Mode: --  Orthostatic VS  08-04-24 @ 07:48  Lying BP: --/-- HR: --  Sitting BP: 136/66 HR: 89  Standing BP: 110/61 HR: 100  Site: --  Mode: --

## 2024-08-05 NOTE — BH INPATIENT PSYCHIATRY PROGRESS NOTE - NSBHFUPINTERVALHXFT_PSY_A_CORE
Chart reviewed, including vital signs, medication administration record, lab results, and nursing notes.   Case discussed with nursing staff     Patient is seen on the patio under no acute distress and amenable to interview. The patient reports that he is feeling better today. States that Depakote has helped him sleep better. Reports that his girlfriend brought him a book to help him deal with grief. He reports his father  of stomach cancer just weeks after being diagnosed, and he becomes tearful as he discusses these events. Patient reports stable sleep and appetite. Denies SI/HI/AVH. Reports no medication side effects. Exhibits no manic symptoms in the morning, including tangential thought process, pressured speech, decreased need for sleep. However, in the evening, patient was somewhat disorganized, believing that his  is doing "Oriental orthodox black magic".

## 2024-08-05 NOTE — BH INPATIENT PSYCHIATRY PROGRESS NOTE - NSBHMETABOLIC_PSY_ALL_CORE_FT
BMI:   HbA1c: A1C with Estimated Average Glucose Result: 4.9 % (07-22-24 @ 09:15)    Glucose:   BP: --Vital Signs Last 24 Hrs  T(C): 36.3 (08-05-24 @ 06:19), Max: 36.7 (08-04-24 @ 20:34)  T(F): 97.3 (08-05-24 @ 06:19), Max: 98.1 (08-04-24 @ 20:34)  HR: --  BP: --  BP(mean): --  RR: --  SpO2: --    Orthostatic VS  08-05-24 @ 06:19  Lying BP: --/-- HR: --  Sitting BP: 100/78 HR: 92  Standing BP: 118/65 HR: 101  Site: --  Mode: --  Orthostatic VS  08-04-24 @ 07:48  Lying BP: --/-- HR: --  Sitting BP: 136/66 HR: 89  Standing BP: 110/61 HR: 100  Site: --  Mode: --    Lipid Panel: Date/Time: 07-22-24 @ 09:15  Cholesterol, Serum: 167  LDL Cholesterol Calculated: 91  HDL Cholesterol, Serum: 67  Total Cholesterol/HDL Ration Measurement: --  Triglycerides, Serum: 45

## 2024-08-05 NOTE — BH INPATIENT PSYCHIATRY PROGRESS NOTE - CURRENT MEDICATION
MEDICATIONS  (STANDING):  divalproex ER 1500 milliGRAM(s) Oral at bedtime  melatonin. 3 milliGRAM(s) Oral at bedtime  risperiDONE   Tablet 6 milliGRAM(s) Oral at bedtime    MEDICATIONS  (PRN):  diphenhydrAMINE 50 milliGRAM(s) Oral every 6 hours PRN Rash and/or Itching/agitation/eps prophylaxis  diphenhydrAMINE Injectable 50 milliGRAM(s) IntraMuscular once PRN agitation/eps prophylaxis  haloperidol     Tablet 5 milliGRAM(s) Oral every 6 hours PRN agitation  haloperidol    Injectable 5 milliGRAM(s) IntraMuscular once PRN severe agitation  hydrOXYzine hydrochloride 25 milliGRAM(s) Oral every 6 hours PRN anxiety  ibuprofen  Tablet. 400 milliGRAM(s) Oral every 6 hours PRN Moderate Pain (4 - 6)  LORazepam     Tablet 2 milliGRAM(s) Oral every 4 hours PRN anxiety/agitation  LORazepam   Injectable 2 milliGRAM(s) IntraMuscular once PRN severe anxiety/severe agitation  oxymetazoline 0.05% Nasal Spray 2 Spray(s) Both Nostrils two times a day PRN nasal congestion

## 2024-08-05 NOTE — BH INPATIENT PSYCHIATRY PROGRESS NOTE - PRN MEDS
MEDICATIONS  (PRN):  diphenhydrAMINE 50 milliGRAM(s) Oral every 6 hours PRN Rash and/or Itching/agitation/eps prophylaxis  diphenhydrAMINE Injectable 50 milliGRAM(s) IntraMuscular once PRN agitation/eps prophylaxis  haloperidol     Tablet 5 milliGRAM(s) Oral every 6 hours PRN agitation  haloperidol    Injectable 5 milliGRAM(s) IntraMuscular once PRN severe agitation  hydrOXYzine hydrochloride 25 milliGRAM(s) Oral every 6 hours PRN anxiety  ibuprofen  Tablet. 400 milliGRAM(s) Oral every 6 hours PRN Moderate Pain (4 - 6)  LORazepam     Tablet 2 milliGRAM(s) Oral every 4 hours PRN anxiety/agitation  LORazepam   Injectable 2 milliGRAM(s) IntraMuscular once PRN severe anxiety/severe agitation  oxymetazoline 0.05% Nasal Spray 2 Spray(s) Both Nostrils two times a day PRN nasal congestion

## 2024-08-06 PROCEDURE — 90853 GROUP PSYCHOTHERAPY: CPT

## 2024-08-06 PROCEDURE — 99232 SBSQ HOSP IP/OBS MODERATE 35: CPT

## 2024-08-06 RX ADMIN — Medication 2 SPRAY(S): at 21:11

## 2024-08-06 RX ADMIN — Medication 3 MILLIGRAM(S): at 20:08

## 2024-08-06 RX ADMIN — Medication 25 MILLIGRAM(S): at 12:44

## 2024-08-06 RX ADMIN — RISPERIDONE 6 MILLIGRAM(S): 1 TABLET, FILM COATED ORAL at 20:07

## 2024-08-06 RX ADMIN — DIVALPROEX SODIUM 1500 MILLIGRAM(S): 125 CAPSULE, COATED PELLETS ORAL at 20:07

## 2024-08-06 NOTE — BH INPATIENT PSYCHIATRY PROGRESS NOTE - NSBHMETABOLIC_PSY_ALL_CORE_FT
BMI:   HbA1c: A1C with Estimated Average Glucose Result: 4.9 % (07-22-24 @ 09:15)    Glucose:   BP: --Vital Signs Last 24 Hrs  T(C): 36.8 (08-06-24 @ 08:45), Max: 36.8 (08-06-24 @ 08:45)  T(F): 98.2 (08-06-24 @ 08:45), Max: 98.2 (08-06-24 @ 08:45)  HR: --  BP: --  BP(mean): --  RR: --  SpO2: --    Orthostatic VS  08-06-24 @ 08:45  Lying BP: --/-- HR: --  Sitting BP: 117/64 HR: 85  Standing BP: 105/65 HR: 120  Site: --  Mode: --  Orthostatic VS  08-05-24 @ 06:19  Lying BP: --/-- HR: --  Sitting BP: 100/78 HR: 92  Standing BP: 118/65 HR: 101  Site: --  Mode: --    Lipid Panel: Date/Time: 07-22-24 @ 09:15  Cholesterol, Serum: 167  LDL Cholesterol Calculated: 91  HDL Cholesterol, Serum: 67  Total Cholesterol/HDL Ration Measurement: --  Triglycerides, Serum: 45

## 2024-08-06 NOTE — BH INPATIENT PSYCHIATRY PROGRESS NOTE - NSBHFUPINTERVALHXFT_PSY_A_CORE
Chart reviewed, including vital signs, medication administration record, lab results, and nursing notes.   Case discussed with nursing staff   - No acute events    Patient is seen in the group room under no acute distress and amenable to interview. The patient reports feeling calmer today and feels that Depakote has led him to have more of a stable mood. He denies any medication side effects. He is excited to have a family meeting before he leaves. Reports increased sleep and stable appetite. Denies SI/HI/AVH.

## 2024-08-07 PROCEDURE — 99232 SBSQ HOSP IP/OBS MODERATE 35: CPT

## 2024-08-07 PROCEDURE — 90853 GROUP PSYCHOTHERAPY: CPT

## 2024-08-07 RX ORDER — LORAZEPAM 1 MG/1
2 TABLET ORAL EVERY 4 HOURS
Refills: 0 | Status: DISCONTINUED | OUTPATIENT
Start: 2024-08-07 | End: 2024-08-08

## 2024-08-07 RX ORDER — HYDROXYZINE HCL 50 MG/ML
1 VIAL (ML) INTRAMUSCULAR
Qty: 120 | Refills: 0
Start: 2024-08-07 | End: 2024-09-05

## 2024-08-07 RX ORDER — DIVALPROEX SODIUM 125 MG/1
3 CAPSULE, COATED PELLETS ORAL
Qty: 90 | Refills: 0
Start: 2024-08-07 | End: 2024-09-05

## 2024-08-07 RX ORDER — RISPERIDONE 1 MG/1
2 TABLET, FILM COATED ORAL
Qty: 60 | Refills: 0
Start: 2024-08-07 | End: 2024-09-05

## 2024-08-07 RX ORDER — LORAZEPAM 1 MG/1
2 TABLET ORAL ONCE
Refills: 0 | Status: DISCONTINUED | OUTPATIENT
Start: 2024-08-07 | End: 2024-08-08

## 2024-08-07 RX ADMIN — Medication 3 MILLIGRAM(S): at 21:54

## 2024-08-07 RX ADMIN — RISPERIDONE 6 MILLIGRAM(S): 1 TABLET, FILM COATED ORAL at 21:52

## 2024-08-07 RX ADMIN — DIVALPROEX SODIUM 1500 MILLIGRAM(S): 125 CAPSULE, COATED PELLETS ORAL at 21:52

## 2024-08-07 NOTE — BH DISCHARGE NOTE NURSING/SOCIAL WORK/PSYCH REHAB - NSDCPRRECOMMEND_PSY_ALL_CORE
Psychiatric Rehabilitation staff recommends that patient continue to explore strategies for improved symptom management and sustained recovery.

## 2024-08-07 NOTE — BH DISCHARGE NOTE NURSING/SOCIAL WORK/PSYCH REHAB - DISCHARGE INSTRUCTIONS AFTERCARE APPOINTMENTS
In order to check the location, date, or time of your aftercare appointment, please refer to your Discharge Instructions Document given to you upon leaving the hospital.  If you have lost the instructions please call [phone]

## 2024-08-07 NOTE — BH INPATIENT PSYCHIATRY DISCHARGE NOTE - HOSPITAL COURSE
Dates of hospitalization: 07/21/2024 - 08/08/2024    Patient is a 24-year-old male with a history of bipolar disorder and psychosis brought in by friend for manic symptoms for one week. The patient was agitated and attempted to leave the ED. He expresses numerous grandiose delusions, pressured speech, disorganization, and indiscretion. This is in the context of medication noncompliance. The differential includes bipolar disorder, brief psychotic disorder, and substance-induced bipolar disorder. Given his recent manic symptoms and disorganization, he is unable to care for himself and therefore meets criteria for inpatient psychiatric hospitalization.  SH: Lives with mother, single. Attends [employer] and plays soccer.  PsyHx: 1x hospitalization in 2020. No SA.     At the start of the hospital stay, the patient exhibited numerous signs of jessica, including pressured speech, grandiosity, and disorganization. The patient was discharge-focused, with no insight into his condition, demanding to be discharged so that he could resume playing soccer. We initially started the antipsychotic Seroquel, but it was not significantly effective. We also started the Ativan to address insomnia, which was tapered by the end of his hospital stay. Subsequently, we switched to the antipsychotic Risperdal, which led to a moderate improvement in his manic symptoms. However, his symptoms did not resolve completely until we also started the mood stabilizer Depakote and titrated to a final dose of 1500 mg nightly. By the end of his hospital stay, the patient’s manic symptoms improved significantly, as did his sleep. We emphasize the importance of medication compliance, and neither he nor his mother expressed any concerns with the plan for discharge. He agreed to continue his care in bipolar clinic.    No medical issues, restraints, or self-injurious behavior noted during the hospitalization.  ------------------  Discussed side effects of antipsychotic medication, including sedation, metabolic syndrome, extrapyramidal symptoms, tardive dyskinesia, and neuroleptic malignant syndrome. Recommended abstinence from drugs of abuse and medical consultation prior to starting additional psychiatric medication, given the potential for drug interactions.   ------------------  At the time of discharge, the patient reported improved mood, exhibited a euthymic affect, and denied SI/HI/AVH or desire to self-harm.  The patient denied any intolerable side effects and agreed with the plan for discharge due to the patient’s confidence that treatment could be successfully continued as an outpatient.    Acute risk factors were mitigated during hospitalization through DBT-oriented group therapy, medication management to target patient's mood lability and suicide risk, provision of a safe and stable environment with reduced access to lethal means, and safety planning. Overall risk had returned to baseline levels by the time of discharge. However, the patient remains at elevated risk of suicide given chronic risk factors, which would not be reduced further by continued hospitalization and are best managed on an outpatient basis. In addition, the patient has numerous protective factors as listed above. The patient was able to engage in safety planning, and neither the patient nor family identified any barriers to discharge.   Therefore, the patient no longer met criteria for inpatient psychiatric hospitalization and was discharged from [address] [place] [address].    DSM-5 diagnosis:  Bipolar 1 disorder    Discharge medication:  - Valproic acid ER 1500 mg QHS (bipolar disorder)  	- Level 52 (8/8/24)  - Risperidone 6 mg QHS (bipolar disorder)  	- Could consider tapering as his improvement was more attributed to VPA  - Trazodone 50 mg QHS (insomnia)    Modifiable/acute risk factors  -	Recent discharge from inpatient psychiatric hospital  -	Recent change in provider or treatment  -	Recent onset of psychiatric illness  -	Global insomnia - resolved  -	Jessica - resolved  Static/chronic risk factors  -	History of psychiatric hospitalization  -	History of bipolar disorder  -	Stigma associated with help-seeking and mental illness  Protective factors  -	Access and adherence to psychiatric care  -	Limited access to lethal means  -	Close involvement of family throughout hospitalization  -	No history of suicide attempt  -	Social support  -	Forward thinking regarding school/career  -	High premorbid functioning  -	Spirituality

## 2024-08-07 NOTE — BH INPATIENT PSYCHIATRY PROGRESS NOTE - NSBHCHARTREVIEWVS_PSY_A_CORE FT
Vital Signs Last 24 Hrs  T(C): 36.2 (08-07-24 @ 06:08), Max: 36.8 (08-06-24 @ 20:41)  T(F): 97.2 (08-07-24 @ 06:08), Max: 98.2 (08-06-24 @ 20:41)  HR: --  BP: --  BP(mean): --  RR: 17 (08-07-24 @ 06:08) (17 - 17)  SpO2: --    Orthostatic VS  08-07-24 @ 06:08  Lying BP: --/-- HR: --  Sitting BP: 117/80 HR: 80  Standing BP: 118/65 HR: 90  Site: --  Mode: --  Orthostatic VS  08-06-24 @ 08:45  Lying BP: --/-- HR: --  Sitting BP: 117/64 HR: 85  Standing BP: 105/65 HR: 120  Site: --  Mode: --

## 2024-08-07 NOTE — BH PSYCHOLOGY - GROUP THERAPY NOTE - TOKEN PULL-DIAGNOSIS
Primary Diagnosis:  Psychosis [F29]        Problem Dx:   
Primary Diagnosis:  Bipolar 1 disorder [F31.9]      Psychosis [F29]        Problem Dx:   

## 2024-08-07 NOTE — BH INPATIENT PSYCHIATRY DISCHARGE NOTE - DESCRIPTION
Domiciled with mother. 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  Father  in October.

## 2024-08-07 NOTE — BH PSYCHOLOGY - GROUP THERAPY NOTE - NSPSYCHOLGRPDBTPT_PSY_A_CORE
stable mood and affect in group/no self-destructive impulses/behaviors/Patient able to identify mood states/other...
labile mood and affect in group/other...
labile mood and affect in group/other...
stable mood and affect in group/no self-destructive impulses/behaviors/Patient able to identify mood states/other...
stable mood and affect in group/no self-destructive impulses/behaviors/Patient able to identify mood states/other...
labile mood and affect in group/other...
stable mood and affect in group/no self-destructive impulses/behaviors/other...

## 2024-08-07 NOTE — BH INPATIENT PSYCHIATRY DISCHARGE NOTE - NSBHMETABOLIC_PSY_ALL_CORE_FT
BMI:   HbA1c: A1C with Estimated Average Glucose Result: 4.9 % (07-22-24 @ 09:15)    Glucose:   BP: --Vital Signs Last 24 Hrs  T(C): 36.2 (08-07-24 @ 06:08), Max: 36.8 (08-06-24 @ 20:41)  T(F): 97.2 (08-07-24 @ 06:08), Max: 98.2 (08-06-24 @ 20:41)  HR: --  BP: --  BP(mean): --  RR: 17 (08-07-24 @ 06:08) (17 - 17)  SpO2: --    Orthostatic VS  08-07-24 @ 06:08  Lying BP: --/-- HR: --  Sitting BP: 117/80 HR: 80  Standing BP: 118/65 HR: 90  Site: --  Mode: --  Orthostatic VS  08-06-24 @ 08:45  Lying BP: --/-- HR: --  Sitting BP: 117/64 HR: 85  Standing BP: 105/65 HR: 120  Site: --  Mode: --    Lipid Panel: Date/Time: 07-22-24 @ 09:15  Cholesterol, Serum: 167  LDL Cholesterol Calculated: 91  HDL Cholesterol, Serum: 67  Total Cholesterol/HDL Ration Measurement: --  Triglycerides, Serum: 45
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
## 2024-08-07 NOTE — BH PSYCHOLOGY - GROUP THERAPY NOTE - NSPSYCHOLGRPDBTGOAL_PSY_A_CORE
reduce mood and affective lability/improve ability to indentify feelings/improve ability to communicate feelings/reduce vulnerability to emotional dysregualation

## 2024-08-07 NOTE — BH DISCHARGE NOTE NURSING/SOCIAL WORK/PSYCH REHAB - NSDCADDINFO1FT_PSY_ALL_CORE
Please use Door # 3 to enter the building  1.) For the appointment to take place please arrive 20 minutes early to the appointment.   2.) For questions needing immediate assistance, please call our  at [phone].

## 2024-08-07 NOTE — BH PSYCHOLOGY - GROUP THERAPY NOTE - NSBHPSYCHOLASSESSPROV_PSY_A_CORE
Licensed Psychologist

## 2024-08-07 NOTE — BH PSYCHOLOGY - GROUP THERAPY NOTE - NSPSYCHOLGRPDBTINT_PSY_A_CORE
other...
reviewed distress tolerance, skills homework
other...
other...
review emotion regulation homework
other...
other...

## 2024-08-07 NOTE — BH PSYCHOLOGY - GROUP THERAPY NOTE - NSBHPSYCHOLGRPTYPE_PSY_A_CORE
Cognitive Behavioral Coping Skills
DBT Life Skills
Cognitive Behavioral Coping Skills
DBT Life Skills

## 2024-08-07 NOTE — BH PSYCHOLOGY - GROUP THERAPY NOTE - NSBHPSYCHOLGRPNAME_PSY_A_CORE
DBT Skills
other...
DBT Skills
DBT Homework
DBT Homework
DBT Skills
DBT Skills
other...
DBT Skills

## 2024-08-07 NOTE — BH PSYCHOLOGY - GROUP THERAPY NOTE - NSPSYCHOLGRPBILLING_PSY_A_CORE
[id number] - Group Psychotherapy
[id number] - Group Psychotherapy
[id number] - Group Psychotherapy
[id number] - Group Psychotherapy
[id number] - Group Psychotherapy
[id number] - Group Psychotherapy
[id number] - Group Psychotherapy
47026 - Group Psychotherapy
37334 - Group Psychotherapy
[id number] - Group Psychotherapy
[id number] - Group Psychotherapy

## 2024-08-07 NOTE — BH INPATIENT PSYCHIATRY DISCHARGE NOTE - NSDCMRMEDTOKEN_GEN_ALL_CORE_FT
divalproex sodium 500 mg oral tablet, extended release: 3 tab(s) orally once a day (at bedtime)  hydrOXYzine hydrochloride 25 mg oral tablet: 1 tab(s) orally every 6 hours as needed for anxiety  risperiDONE 3 mg oral tablet: 2 tab(s) orally once a day (at bedtime)

## 2024-08-07 NOTE — BH PSYCHOLOGY - GROUP THERAPY NOTE - NSPSYCHOLGRPDBTPT_PSY_A_CORE FT
DBT Group is a group in which patients learn skills to better manage their emotions and behaviors. Group begins with a mindfulness practice so that patients have an opportunity to practice observing their internal and external experiences.  Following the mindfulness exercise the group learns new skills in a didactic format. Group today focused on the “emotion regulation” module.  Specifically, patients learned the skill of “check the facts.” “Check the facts” is about being more realistic about interpretations and assumptions and challenging them appropriately to think more rationally.  provided an example of checking the facts, and patients were encouraged to think about how these skills, and were assigned homework to practice. 
DBT Group is a group in which patients learn skills to better manage their emotions and behaviors. Group begins with a mindfulness practice so that patients have an opportunity to practice observing their internal and external experiences.  Following the mindfulness exercise the group learns new skills in a didactic format. Group today focused on the “emotion regulation” module.  Specifically, patients learned the skills of “PLEASE,” or taking care of the mind by taking care of the body. This skill involves maintaining consistent treatment for physical illness, balanced eating, avoidance of mood-altering substances, balanced sleep, and exercise.  provided examples and suggestions of ways to improve these areas, and patients were encouraged to engage in discussion of ways they can prioritize and implement this skill. 
DBT skills generalization group is a group in which patients review the skill taught the day before, and patients have the opportunity to troubleshoot the skill, engage in more practice, and share their experience using the skill. Today’s skills review group focused on the skill of Accumulating Positive Emotions in the Long Term by identifying values and translating those into goals and manageable action steps. Patients shared values that are important to them and how these translate into goals, as well as how they’ve begun to implement these. 
DBT Group is a group in which patients learn skills to better manage their emotions and behaviors. Group begins with a mindfulness practice so that patients have an opportunity to practice observing their internal and external experiences.  Following the mindfulness exercise the group learns new skills in a didactic format. Group today focused on the “distress tolerance” module.  Specifically, patients learned the skills of “STOP,” which teaches patients to pause and think before acting on emotions, and “pros and cons,” which is a way to objectively look at impulsive and destructive behaviors and see the short term and long term consequences of them. Patients were guided through an example of pros and cons provided by the writer as well as scenarios where the STOP skill is relevant, and were encouraged to complete their own pros and cons and STOP skills for homework. 
DBT Group is a group in which patients learn skills to better manage their emotions and behaviors. Group today focused on the “mindfulness” module, which are skills to help patients increase their awareness of their present experience without judgment. Pts were taught the “what” skills (observe, describe, participate) and “how” skills (non-judgmentally, effectively, and one-mindfully) of mindfulness, and engaged in a variety of mindfulness exercises to practice the skills, and shared observations at the end of each activity. 
DBT Group is a group in which patients learn skills to better manage their emotions and behaviors. Group begins with a mindfulness practice so that patients have an opportunity to practice observing their internal and external experiences.  Following the mindfulness exercise the group learns new skills in a didactic format. Group today focused on the “emotion regulation” module.  Specifically, patients learned about the skill of Problem Solving, which is a method of managing difficult situations when an emotion is justified by the situation.  Patients were taught the seven steps of problem solving and provided with an example of a situation in which the skill would be useful.  Patients were also asked to think about when the skill would be helpful in their lives and how to apply the steps. Patients were also provided with a worksheet in order to help them practice the skill. 
DBT skills generalization group is a group in which patients review the skill taught the day before, and patients have the opportunity to troubleshoot the skill, engage in more practice, and share their experience using the skill. Today’s skills review group focused on the skills of “radical acceptance” and "willingness" which include accepting painful situations in their lives they cannot change through turning the mind and practicing engaging in reality effectively. Patients were asked to determine difficult situations in their lives they needed to work on accepting, and provided examples of ways to practice this while in the hospital. 
DBT Group is a group in which patients learn skills to better manage their emotions and behaviors. Group begins with a mindfulness practice so that patients have an opportunity to practice observing their internal and external experiences.  Following the mindfulness exercise the group learns new skills in a didactic format. Group today focused on the “emotion regulation” module.  Specifically, patients learned the skills of “check the facts,” and “opposite action.” “Check the facts” is about being more realistic about interpretations and assumptions and challenging them appropriately to think more rationally, and “opposite action” involves acting opposite to problematic urges that come with emotions.  provided an example of checking the facts, and patients were encouraged to think about how these skills, and were assigned homework to practice. 
DBT Group is a group in which patients learn skills to better manage their emotions and behaviors. Group begins with a mindfulness practice so that patients have an opportunity to practice observing their internal and external experiences.  Following the mindfulness exercise the group learns new skills in a didactic format. Group today focused on the “distress tolerance” module, which are skills to help patients manage their crisis urges.  Specifically, pts learned the skill of “radical acceptance,” which includes accepting painful situations in their lives they cannot change through turning the mind and practicing willingness. Patients were asked to determine difficult situations in their lives they needed to work on accepting, and provided examples of ways to practice this while in the hospital.

## 2024-08-07 NOTE — BH PSYCHOLOGY - GROUP THERAPY NOTE - NSBHPTASSESSDT_PSY_A_CORE
01-Aug-2024 11:15
07-Aug-2024 09:15
24-Jul-2024 09:15
25-Jul-2024 15:15
26-Jul-2024 11:15
30-Jul-2024 11:15
31-Jul-2024 09:15
06-Aug-2024 11:15
22-Jul-2024 11:15
01-Aug-2024 15:15
05-Aug-2024 11:15

## 2024-08-07 NOTE — BH PSYCHOLOGY - GROUP THERAPY NOTE - NSPSYCHOLGRPDBTEMOT_PSY_A_CORE FT
Problem Solving 
PLEASE 
na
Check the Facts 
na
na
Accumulating Positives: Long Term 
na
Opposite Action

## 2024-08-07 NOTE — BH DISCHARGE NOTE NURSING/SOCIAL WORK/PSYCH REHAB - NSDCPRGOAL_PSY_ALL_CORE
Patient met psychiatric rehabilitation goal of exhibiting a substantial reduction in elated/angry acting out and pressured speech that prevents mutual conversation. Patient attended most psychiatric rehabilitation groups and was engaged appropriately during sessions. Patient was able to engage in safety planning prior to discharge.
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
## 2024-08-07 NOTE — BH DISCHARGE NOTE NURSING/SOCIAL WORK/PSYCH REHAB - PATIENT PORTAL LINK FT
You can access the FollowMyHealth Patient Portal offered by [facility] by registering at the following website: [email or web address]. By joining [facility]’s FollowMyHealth portal, you will also be able to view your health information using other applications (apps) compatible with our system.

## 2024-08-07 NOTE — BH PSYCHOLOGY - GROUP THERAPY NOTE - NSBHPSYCHOLPARTICIP_PSY_A_CORE
Fully engaged
Fully engaged
Partially engaged
Fully engaged
Partially engaged
Fully engaged
Partially engaged
Fully engaged
Fully engaged
Partially engaged
Fully engaged

## 2024-08-07 NOTE — BH PSYCHOLOGY - GROUP THERAPY NOTE - NSPSYCHOLGRPDBTINT_PSY_A_CORE FT
taught emotion regulation skill 
taught emotion regulation skill 
taught distress tolerance skill 
taught emotion regulation skill 
taught emotion regulation skill 
taught mindfulness skill 
taught distress tolerance skill

## 2024-08-07 NOTE — BH DISCHARGE NOTE NURSING/SOCIAL WORK/PSYCH REHAB - NSBHDCADDR1FT_A_CORE
[facility] [place]- 1st floor  [address]  [facility] [address]

## 2024-08-07 NOTE — BH PSYCHOLOGY - GROUP THERAPY NOTE - NSPSYCHOLGRPDBTPROB_PSY_A_CORE
psychotic episodes

## 2024-08-07 NOTE — BH PSYCHOLOGY - GROUP THERAPY NOTE - NSBHPSYCHOLRESPONSE_PSY_A_CORE
Accepted support
Coping skills acquired/Insight displayed/Accepted support
Accepted support
Coping skills acquired/Insight displayed/Accepted support
Coping skills acquired/Insight displayed/Accepted support
Coping skills acquired/Accepted support
Coping skills acquired/Insight displayed/Accepted support
Coping skills acquired/Insight displayed/Accepted support

## 2024-08-07 NOTE — BH INPATIENT PSYCHIATRY PROGRESS NOTE - NSBHFUPINTERVALHXFT_PSY_A_CORE
Chart reviewed, including vital signs, medication administration record, lab results, and nursing notes.   Case discussed with nursing staff   - No acute events    Patient is seen in the group room under no acute distress and amenable to interview. The patient acknowledges that VPA has been helping him. He asked whether he can stop taking medication before soccer matches, because he feels over sedated at times. I encouraged him to continue taking the medication as prescribed and discuss with his psychiatrist if he has any issues. Otherwise, he denies medication side effects. Reports stable sleep and appetite. Denies SI/HI/AVH.

## 2024-08-07 NOTE — BH DISCHARGE NOTE NURSING/SOCIAL WORK/PSYCH REHAB - NSCDUDCCRISIS_PSY_A_CORE
.[facility] 1 [phone] 136-DUXU (1232) Website: www.safe[facility].org/.National Suicide Prevention Lifeline 8 (258) 492-1910/.  [facility]  [phone]/.  [facility]  [address] [place], [place] [address] [phone]   Hours:  Monday through Friday from 9 AM to 3 PM/988 Suicide and Crisis Lifeline

## 2024-08-08 VITALS — TEMPERATURE: 98 F

## 2024-08-08 LAB — VALPROATE SERPL-MCNC: 52.6 UG/ML — SIGNIFICANT CHANGE UP (ref 50–100)

## 2024-08-08 PROCEDURE — 99232 SBSQ HOSP IP/OBS MODERATE 35: CPT

## 2024-08-08 NOTE — BH TREATMENT PLAN - NSTXCAREGIVERPARTICIPATE_PSY_P_CORE
Family/Caregiver participated in identification of needs/problems/goals for treatment
Family/Caregiver participated in identification of needs/problems/goals for treatment
Family/Caregiver participated in identification of needs/problems/goals for treatment/Family/Caregiver participated in defining interventions/Family/Caregiver participated in development of after care plan

## 2024-08-08 NOTE — BH INPATIENT PSYCHIATRY PROGRESS NOTE - NSTXDCOPLKDATETRGT_PSY_ALL_CORE
05-Aug-2024
29-Jul-2024
29-Jul-2024
12-Aug-2024
05-Aug-2024
29-Jul-2024
05-Aug-2024
05-Aug-2024
29-Jul-2024
12-Aug-2024
05-Aug-2024
12-Aug-2024
05-Aug-2024
29-Jul-2024
29-Jul-2024
12-Aug-2024
05-Aug-2024

## 2024-08-08 NOTE — BH INPATIENT PSYCHIATRY PROGRESS NOTE - CURRENT MEDICATION
MEDICATIONS  (STANDING):  divalproex ER 1500 milliGRAM(s) Oral at bedtime  melatonin. 3 milliGRAM(s) Oral at bedtime  risperiDONE   Tablet 6 milliGRAM(s) Oral at bedtime    MEDICATIONS  (PRN):  diphenhydrAMINE 50 milliGRAM(s) Oral every 6 hours PRN Rash and/or Itching/agitation/eps prophylaxis  diphenhydrAMINE Injectable 50 milliGRAM(s) IntraMuscular once PRN agitation/eps prophylaxis  haloperidol     Tablet 5 milliGRAM(s) Oral every 6 hours PRN agitation  haloperidol    Injectable 5 milliGRAM(s) IntraMuscular once PRN severe agitation  hydrOXYzine hydrochloride 25 milliGRAM(s) Oral every 6 hours PRN anxiety  ibuprofen  Tablet. 400 milliGRAM(s) Oral every 6 hours PRN Moderate Pain (4 - 6)  LORazepam     Tablet 2 milliGRAM(s) Oral every 4 hours PRN anxiety/agitation  LORazepam   Injectable 2 milliGRAM(s) IntraMuscular once PRN severe anxiety/severe agitation

## 2024-08-08 NOTE — BH INPATIENT PSYCHIATRY PROGRESS NOTE - NSBHMSETHTCONTENT_PSY_A_CORE
Unremarkable
Ideas of reference
Ideas of reference
Unremarkable
Ideas of reference
Unremarkable

## 2024-08-08 NOTE — BH INPATIENT PSYCHIATRY PROGRESS NOTE - NSBHASSESSSUMMFT_PSY_ALL_CORE
Patient is a 24-year-old male with a history of bipolar disorder and psychosis brought in by friend for manic symptoms for one week. The patient was agitated and attempted to leave the ED. He expresses numerous grandiose delusions, pressured speech, disorganization, and indiscretion. This is in the context of medication noncompliance. The differential includes bipolar disorder, brief psychotic disorder, and substance-induced bipolar disorder. Given his recent manic symptoms and disorganization, he is unable to care for himself and therefore meets criteria for inpatient psychiatric hospitalization.  SH: Lives with mother, single. Attends [employer] and plays soccer.  PsyHx: 1x hospitalization in 2020. No SA.     Week of 7/25: Patient started the week discharge-focused, with no insight into tc. As we titrated Risperdal and Ativan his manic symptoms improved and he was less discharge-focused.   Week of 8/2: Patient's manic symptoms got worse this week as we decreased the dose of Ativan. We will start the mood stabilizer Depakote.    1. Admission status  9.27 (Involuntary admission)    2. Significant lab findings  None    3. Psychotropic medication  - Valproic acid ER 1000 mg QHS (tc)  	- Start 8/2  	- Checking level 8/5  - Risperidone 6 mg QHS (tc)  	- Start 7/21, inc 7/22, inc 7/24, inc 7/26, inc 7/29  - Trazodone 50 mg QHS (insomnia)  	- Start 7/23    Agitation PRNs: Haloperidol PO/IM, Lorazepam PO/IM, Diphenhydramine PO/IM    DISCONTINUED Quetiapine 100 mg QHS (tc)  	- Switching to Risperidone  DISCONTINUED Lorazepam 1 mg QHS (tc)  	- Start 7/24, dec 7/30, DC 8/1  	- Discontinued due to improving sleep    4. Medical conditions, other medication, consults  A) Stye  - Encourage use of warm compresses    5. Psychosocial interventions  - Patient provided verbal consent to speak with family and friend Krista  [place] CO 1:1: Not required  - Restrictions/allowances: None    
Patient is a 24-year-old male with a history of bipolar disorder and psychosis brought in by friend for manic symptoms for one week. The patient was agitated and attempted to leave the ED. He expresses numerous grandiose delusions, pressured speech, disorganization, and indiscretion. This is in the context of medication noncompliance. The differential includes bipolar disorder, brief psychotic disorder, and substance-induced bipolar disorder. Given his recent manic symptoms and disorganization, he is unable to care for himself and therefore meets criteria for inpatient psychiatric hospitalization.  SH: Lives with mother, single. Attends [employer] and plays soccer.  PsyHx: 1x hospitalization in 2020. No SA.     1. Admission status  9.27 (Involuntary admission)    2. Significant lab findings  None    3. Psychotropic medication  - Risperidone 3 mg QHS (tc)  	- Start 7/21, inc 7/22, inc 7/24  - Lorazepam 2 mg QHS (tc)  	- Start 7/24  - Trazodone 50 mg QHS (insomnia)  	- Start 7/23    Agitation PRNs: Haloperidol PO/IM, Lorazepam PO/IM, Diphenhydramine PO/IM    DISCONTINUED Quetiapine 100 mg QHS (tc)  	- Switching to Risperidone    4. Medical conditions, other medication, consults  None    5. Psychosocial interventions  - Patient provided verbal consent to speak with family and friend Krista  [place] 1:1: Not required  - Restrictions/allowances: None    
Patient is a 24-year-old male with a history of bipolar disorder and psychosis brought in by friend for manic symptoms for one week. The patient was agitated and attempted to leave the ED. He expresses numerous grandiose delusions, pressured speech, disorganization, and indiscretion. This is in the context of medication noncompliance. The differential includes bipolar disorder, brief psychotic disorder, and substance-induced bipolar disorder. Given his recent manic symptoms and disorganization, he is unable to care for himself and therefore meets criteria for inpatient psychiatric hospitalization.  SH: Lives with mother, single. Attends [employer] and plays soccer.  PsyHx: 1x hospitalization in 2020. No SA.     Week of 7/25: Patient started the week discharge-focused, with no insight into tc. As we titrated Risperdal and Ativan his manic symptoms improved significantly.     1. Admission status  9.27 (Involuntary admission)    2. Significant lab findings  None    3. Psychotropic medication  - Risperidone 4 mg QHS (tc)  	- Start 7/21, inc 7/22, inc 7/24, inc 7/26  - Lorazepam 2 mg QHS (tc)  	- Start 7/24  - Trazodone 50 mg QHS (insomnia)  	- Start 7/23    Agitation PRNs: Haloperidol PO/IM, Lorazepam PO/IM, Diphenhydramine PO/IM    DISCONTINUED Quetiapine 100 mg QHS (tc)  	- Switching to Risperidone    4. Medical conditions, other medication, consults  None    5. Psychosocial interventions  - Patient provided verbal consent to speak with family and friend Krista  - CO 1:1: Not required  - Restrictions/allowances: None    
Patient is a 24-year-old male with a history of bipolar disorder and psychosis brought in by friend for manic symptoms for one week. The patient was agitated and attempted to leave the ED. He expresses numerous grandiose delusions, pressured speech, disorganization, and indiscretion. This is in the context of medication noncompliance. The differential includes bipolar disorder, brief psychotic disorder, and substance-induced bipolar disorder. Given his recent manic symptoms and disorganization, he is unable to care for himself and therefore meets criteria for inpatient psychiatric hospitalization.  SH: Lives with mother, single. Attends [employer] and plays soccer.  PsyHx: 1x hospitalization in 2020. No SA.     1. Admission status  9.27 (Involuntary admission)    2. Significant lab findings  None    3. Psychotropic medication  - Risperidone 2 mg QHS (tc)  	- Start 7/21, inc 7/22  - Quetiapine 100 mg QHS (tc)  	- Switching to Risperidone    Agitation PRNs: Haloperidol PO/IM, Lorazepam PO/IM, Diphenhydramine PO/IM    4. Medical conditions, other medication, consults  None    5. Psychosocial interventions  - Patient provided verbal consent to speak with family and friend Krista  - CO 1:1: Not required  - Restrictions/allowances: None    
Patient is a 24-year-old male with a history of bipolar disorder and psychosis brought in by friend for manic symptoms for one week. The patient was agitated and attempted to leave the ED. He expresses numerous grandiose delusions, pressured speech, disorganization, and indiscretion. This is in the context of medication noncompliance. The differential includes bipolar disorder, brief psychotic disorder, and substance-induced bipolar disorder. Given his recent manic symptoms and disorganization, he is unable to care for himself and therefore meets criteria for inpatient psychiatric hospitalization.  SH: Lives with mother, single. Attends [employer] and plays soccer.  PsyHx: 1x hospitalization in 2020. No SA.     Week of 7/25: Patient started the week discharge-focused, with no insight into tc. As we titrated Risperdal and Ativan his manic symptoms improved and he was less discharge-focused.   Week of 8/2: Patient's manic symptoms got worse this week as we decreased the dose of Ativan. We will start the mood stabilizer Depakote. Will titrate to 1500 mg given subtherapeutic level.     1. Admission status  9.27 (Involuntary admission)    2. Significant lab findings  None    3. Psychotropic medication  - Valproic acid ER 1500 mg QHS (tc)  	- Start 8/2, inc 8/5  	- Level 33 (8/5)  - Risperidone 6 mg QHS (tc)  	- Start 7/21, inc 7/22, inc 7/24, inc 7/26, inc 7/29  - Trazodone 50 mg QHS (insomnia)  	- Start 7/23    Agitation PRNs: Haloperidol PO/IM, Lorazepam PO/IM, Diphenhydramine PO/IM    DISCONTINUED Quetiapine 100 mg QHS (tc)  	- Switching to Risperidone  DISCONTINUED Lorazepam 1 mg QHS (tc)  	- Start 7/24, dec 7/30, DC 8/1  	- Discontinued due to improving sleep    4. Medical conditions, other medication, consults  A) Stye  - Encourage use of warm compresses    5. Psychosocial interventions  - Patient provided verbal consent to speak with family and friend Krista  - CO 1:1: Not required  - Restrictions/allowances: None    
Patient is a 24-year-old male with a history of bipolar disorder and psychosis brought in by friend for manic symptoms for one week. The patient was agitated and attempted to leave the ED. He expresses numerous grandiose delusions, pressured speech, disorganization, and indiscretion. This is in the context of medication noncompliance. The differential includes bipolar disorder, brief psychotic disorder, and substance-induced bipolar disorder. Given his recent manic symptoms and disorganization, he is unable to care for himself and therefore meets criteria for inpatient psychiatric hospitalization.  SH: Lives with mother, single. Attends [employer] and plays soccer.  PsyHx: 1x hospitalization in 2020. No SA.     Week of 7/25: Patient started the week discharge-focused, with no insight into tc. As we titrated Risperdal and Ativan his manic symptoms improved and he was less discharge-focused.     1. Admission status  9.27 (Involuntary admission)    2. Significant lab findings  None    3. Psychotropic medication  - Risperidone 6 mg QHS (tc)  	- Start 7/21, inc 7/22, inc 7/24, inc 7/26, inc 7/29  - Lorazepam 1 mg QHS (tc)  	- Start 7/24, dec 7/30  - Trazodone 50 mg QHS (insomnia)  	- Start 7/23    Agitation PRNs: Haloperidol PO/IM, Lorazepam PO/IM, Diphenhydramine PO/IM    DISCONTINUED Quetiapine 100 mg QHS (tc)  	- Switching to Risperidone    4. Medical conditions, other medication, consults  None    5. Psychosocial interventions  - Patient provided verbal consent to speak with family and friend Krista  - CO 1:1: Not required  - Restrictions/allowances: None    
Patient is a 24-year-old male with a history of bipolar disorder and psychosis brought in by friend for manic symptoms for one week. The patient was agitated and attempted to leave the ED. He expresses numerous grandiose delusions, pressured speech, disorganization, and indiscretion. This is in the context of medication noncompliance. The differential includes bipolar disorder, brief psychotic disorder, and substance-induced bipolar disorder. Given his recent manic symptoms and disorganization, he is unable to care for himself and therefore meets criteria for inpatient psychiatric hospitalization.  SH: Lives with mother, single. Attends [employer] and plays soccer.  PsyHx: 1x hospitalization in 2020. No SA.     Week of 7/25: Patient started the week discharge-focused, with no insight into tc. As we titrated Risperdal and Ativan his manic symptoms improved and he was less discharge-focused.   Week of 8/2: Patient's manic symptoms got worse this week as we decreased the dose of Ativan. We will start the mood stabilizer Depakote. Will titrate to 1500 mg given subtherapeutic level.     1. Admission status  9.27 (Involuntary admission)    2. Significant lab findings  None    3. Psychotropic medication  - Valproic acid ER 1500 mg QHS (tc)  	- Start 8/2, inc 8/5  	- Level 33 (8/5)  - Risperidone 6 mg QHS (tc)  	- Start 7/21, inc 7/22, inc 7/24, inc 7/26, inc 7/29  - Trazodone 50 mg QHS (insomnia)  	- Start 7/23    Agitation PRNs: Haloperidol PO/IM, Lorazepam PO/IM, Diphenhydramine PO/IM    DISCONTINUED Quetiapine 100 mg QHS (tc)  	- Switching to Risperidone  DISCONTINUED Lorazepam 1 mg QHS (tc)  	- Start 7/24, dec 7/30, DC 8/1  	- Discontinued due to improving sleep    4. Medical conditions, other medication, consults  A) Stye  - Encourage use of warm compresses    5. Psychosocial interventions  - Patient provided verbal consent to speak with family and friend Krista  - CO 1:1: Not required  - Restrictions/allowances: None    
Patient is a 24-year-old male with a history of bipolar disorder and psychosis brought in by friend for manic symptoms for one week. The patient was agitated and attempted to leave the ED. He expresses numerous grandiose delusions, pressured speech, disorganization, and indiscretion. This is in the context of medication noncompliance. The differential includes bipolar disorder, brief psychotic disorder, and substance-induced bipolar disorder. Given his recent manic symptoms and disorganization, he is unable to care for himself and therefore meets criteria for inpatient psychiatric hospitalization.  SH: Lives with mother, single. Attends [employer] and plays soccer.  PsyHx: 1x hospitalization in 2020. No SA.     Week of 7/25: Patient started the week discharge-focused, with no insight into tc. As we titrated Risperdal and Ativan his manic symptoms improved and he was less discharge-focused.   Week of 8/2: Patient's manic symptoms got worse this week as we decreased the dose of Ativan. We will start the mood stabilizer Depakote. Will titrate to 1500 mg given subtherapeutic level.     1. Admission status  9.27 (Involuntary admission)    2. Significant lab findings  None    3. Psychotropic medication  - Valproic acid ER 1500 mg QHS (tc)  	- Start 8/2, inc 8/5  	- Level 33 (8/5)  - Risperidone 6 mg QHS (tc)  	- Start 7/21, inc 7/22, inc 7/24, inc 7/26, inc 7/29  - Trazodone 50 mg QHS (insomnia)  	- Start 7/23    Agitation PRNs: Haloperidol PO/IM, Lorazepam PO/IM, Diphenhydramine PO/IM    DISCONTINUED Quetiapine 100 mg QHS (tc)  	- Switching to Risperidone  DISCONTINUED Lorazepam 1 mg QHS (tc)  	- Start 7/24, dec 7/30, DC 8/1  	- Discontinued due to improving sleep    4. Medical conditions, other medication, consults  A) Stye  - Encourage use of warm compresses    5. Psychosocial interventions  - Patient provided verbal consent to speak with family and friend Krista  - CO 1:1: Not required  - Restrictions/allowances: None    
Patient is a 24-year-old male with a history of bipolar disorder and psychosis brought in by friend for manic symptoms for one week. The patient was agitated and attempted to leave the ED. He expresses numerous grandiose delusions, pressured speech, disorganization, and indiscretion. This is in the context of medication noncompliance. The differential includes bipolar disorder, brief psychotic disorder, and substance-induced bipolar disorder. Given his recent manic symptoms and disorganization, he is unable to care for himself and therefore meets criteria for inpatient psychiatric hospitalization.  SH: Lives with mother, single. Attends [employer] and plays soccer.  PsyHx: 1x hospitalization in 2020. No SA.     Week of 7/25: Patient started the week discharge-focused, with no insight into tc. As we titrated Risperdal and Ativan his manic symptoms improved significantly and he was less discharge-focused.     1. Admission status  9.27 (Involuntary admission)    2. Significant lab findings  None    3. Psychotropic medication  - Risperidone 6 mg QHS (tc)  	- Start 7/21, inc 7/22, inc 7/24, inc 7/26, inc 7/29  - Lorazepam 2 mg QHS (tc)  	- Start 7/24  - Trazodone 50 mg QHS (insomnia)  	- Start 7/23    Agitation PRNs: Haloperidol PO/IM, Lorazepam PO/IM, Diphenhydramine PO/IM    DISCONTINUED Quetiapine 100 mg QHS (tc)  	- Switching to Risperidone    4. Medical conditions, other medication, consults  None    5. Psychosocial interventions  - Patient provided verbal consent to speak with family and friend Krista  - CO 1:1: Not required  - Restrictions/allowances: None    
Patient is a 24-year-old male with a history of bipolar disorder and psychosis brought in by friend for manic symptoms for one week. The patient was agitated and attempted to leave the ED. He expresses numerous grandiose delusions, pressured speech, disorganization, and indiscretion. This is in the context of medication noncompliance. The differential includes bipolar disorder, brief psychotic disorder, and substance-induced bipolar disorder. Given his recent manic symptoms and disorganization, he is unable to care for himself and therefore meets criteria for inpatient psychiatric hospitalization.  SH: Lives with mother, single. Attends [employer] and plays soccer.  PsyHx: 1x hospitalization in 2020. No SA.     Week of 7/25: Patient started the week discharge-focused, with no insight into tc. As we titrated Risperdal and Ativan his manic symptoms improved and he was less discharge-focused.     1. Admission status  9.27 (Involuntary admission)    2. Significant lab findings  None    3. Psychotropic medication  - Risperidone 6 mg QHS (tc)  	- Start 7/21, inc 7/22, inc 7/24, inc 7/26, inc 7/29  - Trazodone 50 mg QHS (insomnia)  	- Start 7/23    Agitation PRNs: Haloperidol PO/IM, Lorazepam PO/IM, Diphenhydramine PO/IM    DISCONTINUED Quetiapine 100 mg QHS (tc)  	- Switching to Risperidone  DISCONTINUED Lorazepam 1 mg QHS (tc)  	- Start 7/24, dec 7/30, DC 8/1  	- Discontinued due to improving sleep    4. Medical conditions, other medication, consults  A) Stye  - Encourage use of warm compresses    5. Psychosocial interventions  - Patient provided verbal consent to speak with family and friend Krista  [place] CO 1:1: Not required  - Restrictions/allowances: None    
Patient is a 24-year-old male with a history of bipolar disorder and psychosis brought in by friend for manic symptoms for one week. The patient was agitated and attempted to leave the ED. He expresses numerous grandiose delusions, pressured speech, disorganization, and indiscretion. This is in the context of medication noncompliance. The differential includes bipolar disorder, brief psychotic disorder, and substance-induced bipolar disorder. Given his recent manic symptoms and disorganization, he is unable to care for himself and therefore meets criteria for inpatient psychiatric hospitalization.  SH: Lives with mother, single. Attends [employer] and plays soccer.  PsyHx: 1x hospitalization in 2020. No SA.     Week of 7/25: Patient started the week discharge-focused, with no insight into tc. As we titrated Risperdal and Ativan his manic symptoms improved significantly.     1. Admission status  9.27 (Involuntary admission)    2. Significant lab findings  None    3. Psychotropic medication  - Risperidone 4 mg QHS (tc)  	- Start 7/21, inc 7/22, inc 7/24, inc 7/26  - Lorazepam 2 mg QHS (tc)  	- Start 7/24  - Trazodone 50 mg QHS (insomnia)  	- Start 7/23    Agitation PRNs: Haloperidol PO/IM, Lorazepam PO/IM, Diphenhydramine PO/IM    DISCONTINUED Quetiapine 100 mg QHS (tc)  	- Switching to Risperidone    4. Medical conditions, other medication, consults  None    5. Psychosocial interventions  - Patient provided verbal consent to speak with family and friend Krista  - CO 1:1: Not required  - Restrictions/allowances: None    
Patient is a 24-year-old male with a history of bipolar disorder and psychosis brought in by friend for manic symptoms for one week. The patient was agitated and attempted to leave the ED. He expresses numerous grandiose delusions, pressured speech, disorganization, and indiscretion. This is in the context of medication noncompliance. The differential includes bipolar disorder, brief psychotic disorder, and substance-induced bipolar disorder. Given his recent manic symptoms and disorganization, he is unable to care for himself and therefore meets criteria for inpatient psychiatric hospitalization.  SH: Lives with mother, single. Attends [employer] and plays soccer.  PsyHx: 1x hospitalization in 2020. No SA.     Week of 7/25: Patient started the week discharge-focused, with no insight into tc. As we titrated Risperdal and Ativan his manic symptoms improved significantly.     1. Admission status  9.27 (Involuntary admission)    2. Significant lab findings  None    3. Psychotropic medication  - Risperidone 4 mg QHS (tc)  	- Start 7/21, inc 7/22, inc 7/24, inc 7/26  - Lorazepam 2 mg QHS (tc)  	- Start 7/24  - Trazodone 50 mg QHS (insomnia)  	- Start 7/23    Agitation PRNs: Haloperidol PO/IM, Lorazepam PO/IM, Diphenhydramine PO/IM    DISCONTINUED Quetiapine 100 mg QHS (tc)  	- Switching to Risperidone    4. Medical conditions, other medication, consults  None    5. Psychosocial interventions  - Patient provided verbal consent to speak with family and friend Krista  - CO 1:1: Not required  - Restrictions/allowances: None    
Patient is a 24-year-old male with a history of bipolar disorder and psychosis brought in by friend for manic symptoms for one week. The patient was agitated and attempted to leave the ED. He expresses numerous grandiose delusions, pressured speech, disorganization, and indiscretion. This is in the context of medication noncompliance. The differential includes bipolar disorder, brief psychotic disorder, and substance-induced bipolar disorder. Given his recent manic symptoms and disorganization, he is unable to care for himself and therefore meets criteria for inpatient psychiatric hospitalization.  SH: Lives with mother, single. Attends [employer] and plays soccer.  PsyHx: 1x hospitalization in 2020. No SA.     Week of 7/25: Patient started the week discharge-focused, with no insight into tc. As we titrated Risperdal and Ativan his manic symptoms improved and he was less discharge-focused.   Week of 8/2: Patient's manic symptoms got worse this week as we decreased the dose of Ativan. We will start the mood stabilizer Depakote.    1. Admission status  9.27 (Involuntary admission)    2. Significant lab findings  None    3. Psychotropic medication  - Valproic acid ER 1000 mg QHS (tc)  	- Start 8/2  	- Checking level 8/5  - Risperidone 6 mg QHS (tc)  	- Start 7/21, inc 7/22, inc 7/24, inc 7/26, inc 7/29  - Trazodone 50 mg QHS (insomnia)  	- Start 7/23    Agitation PRNs: Haloperidol PO/IM, Lorazepam PO/IM, Diphenhydramine PO/IM    DISCONTINUED Quetiapine 100 mg QHS (tc)  	- Switching to Risperidone  DISCONTINUED Lorazepam 1 mg QHS (tc)  	- Start 7/24, dec 7/30, DC 8/1  	- Discontinued due to improving sleep    4. Medical conditions, other medication, consults  A) Stye  - Encourage use of warm compresses    5. Psychosocial interventions  - Patient provided verbal consent to speak with family and friend Krista  [place] CO 1:1: Not required  - Restrictions/allowances: None    
Patient is a 24-year-old male with a history of bipolar disorder and psychosis brought in by friend for manic symptoms for one week. The patient was agitated and attempted to leave the ED. He expresses numerous grandiose delusions, pressured speech, disorganization, and indiscretion. This is in the context of medication noncompliance. The differential includes bipolar disorder, brief psychotic disorder, and substance-induced bipolar disorder. Given his recent manic symptoms and disorganization, he is unable to care for himself and therefore meets criteria for inpatient psychiatric hospitalization.  SH: Lives with mother, single. Attends [employer] and plays soccer.  PsyHx: 1x hospitalization in 2020. No SA.     Week of 7/25: Patient started the week discharge-focused, with no insight into tc. As we titrated Risperdal and Ativan his manic symptoms improved and he was less discharge-focused.   Week of 8/2: Patient's manic symptoms got worse this week as we decreased the dose of Ativan. We will start the mood stabilizer Depakote. Will titrate to 1500 mg given subtherapeutic level.     1. Admission status  9.27 (Involuntary admission)    2. Significant lab findings  None    3. Psychotropic medication  - Valproic acid ER 1500 mg QHS (tc)  	- Start 8/2, inc 8/5  	- Level 52 (8/8), 33 (8/5)  - Risperidone 6 mg QHS (tc)  	- Start 7/21, inc 7/22, inc 7/24, inc 7/26, inc 7/29  - Trazodone 50 mg QHS (insomnia)  	- Start 7/23    Agitation PRNs: Haloperidol PO/IM, Lorazepam PO/IM, Diphenhydramine PO/IM    DISCONTINUED Quetiapine 100 mg QHS (tc)  	- Switching to Risperidone  DISCONTINUED Lorazepam 1 mg QHS (tc)  	- Start 7/24, dec 7/30, DC 8/1  	- Discontinued due to improving sleep    4. Medical conditions, other medication, consults  A) Stye  - Encourage use of warm compresses    5. Psychosocial interventions  - Patient provided verbal consent to speak with family and friend Krista  - CO 1:1: Not required  - Restrictions/allowances: None    
Patient is a 24-year-old male with a history of bipolar disorder and psychosis brought in by friend for manic symptoms for one week. The patient was agitated and attempted to leave the ED. He expresses numerous grandiose delusions, pressured speech, disorganization, and indiscretion. This is in the context of medication noncompliance. The differential includes bipolar disorder, brief psychotic disorder, and substance-induced bipolar disorder. Given his recent manic symptoms and disorganization, he is unable to care for himself and therefore meets criteria for inpatient psychiatric hospitalization.  SH: Lives with mother, single. Attends [employer] and plays soccer.  PsyHx: 1x hospitalization in 2020. No SA.     Week of 7/25: Patient started the week discharge-focused, with no insight into tc. As we titrated Risperdal and Ativan his manic symptoms improved and he was less discharge-focused.   Week of 8/2: Patient's manic symptoms got worse this week as we decreased the dose of Ativan. We will start the mood stabilizer Depakote.    1. Admission status  9.27 (Involuntary admission)    2. Significant lab findings  None    3. Psychotropic medication  - Valproic acid ER 1000 mg QHS (tc)  	- Start 8/2  	- Checking level 8/5  - Risperidone 6 mg QHS (tc)  	- Start 7/21, inc 7/22, inc 7/24, inc 7/26, inc 7/29  - Trazodone 50 mg QHS (insomnia)  	- Start 7/23    Agitation PRNs: Haloperidol PO/IM, Lorazepam PO/IM, Diphenhydramine PO/IM    DISCONTINUED Quetiapine 100 mg QHS (tc)  	- Switching to Risperidone  DISCONTINUED Lorazepam 1 mg QHS (tc)  	- Start 7/24, dec 7/30, DC 8/1  	- Discontinued due to improving sleep    4. Medical conditions, other medication, consults  A) Stye  - Encourage use of warm compresses    5. Psychosocial interventions  - Patient provided verbal consent to speak with family and friend Krista  [place] CO 1:1: Not required  - Restrictions/allowances: None    
Patient is a 24-year-old male with a history of bipolar disorder and psychosis brought in by friend for manic symptoms for one week. The patient was agitated and attempted to leave the ED. He expresses numerous grandiose delusions, pressured speech, disorganization, and indiscretion. This is in the context of medication noncompliance. The differential includes bipolar disorder, brief psychotic disorder, and substance-induced bipolar disorder. Given his recent manic symptoms and disorganization, he is unable to care for himself and therefore meets criteria for inpatient psychiatric hospitalization.  SH: Lives with mother, single. Attends [employer] and plays soccer.  PsyHx: 1x hospitalization in 2020. No SA.     1. Admission status  9.27 (Involuntary admission)    2. Significant lab findings  None    3. Psychotropic medication  - Risperidone 2 mg QHS (tc)  	- Start 7/21, inc 7/22  - Trazodone 50 mg QHS (insomnia)  	- Start 7/23    Agitation PRNs: Haloperidol PO/IM, Lorazepam PO/IM, Diphenhydramine PO/IM    DISCONTINUED Quetiapine 100 mg QHS (tc)  	- Switching to Risperidone    4. Medical conditions, other medication, consults  None    5. Psychosocial interventions  - Patient provided verbal consent to speak with family and friend Krista  - CO 1:1: Not required  - Restrictions/allowances: None    
Patient is a 24-year-old male with a history of bipolar disorder and psychosis brought in by friend for manic symptoms for one week. The patient was agitated and attempted to leave the ED. He expresses numerous grandiose delusions, pressured speech, disorganization, and indiscretion. This is in the context of medication noncompliance. The differential includes bipolar disorder, brief psychotic disorder, and substance-induced bipolar disorder. Given his recent manic symptoms and disorganization, he is unable to care for himself and therefore meets criteria for inpatient psychiatric hospitalization.  SH: Lives with mother, single. Attends [employer] and plays soccer.  PsyHx: 1x hospitalization in 2020. No SA.     Week of 7/25: Patient started the week discharge-focused, with no insight into tc. As we titrated Risperdal and Ativan his manic symptoms improved significantly.     1. Admission status  9.27 (Involuntary admission)    2. Significant lab findings  None    3. Psychotropic medication  - Risperidone 3 mg QHS (tc)  	- Start 7/21, inc 7/22, inc 7/24  - Lorazepam 2 mg QHS (tc)  	- Start 7/24  - Trazodone 50 mg QHS (insomnia)  	- Start 7/23    Agitation PRNs: Haloperidol PO/IM, Lorazepam PO/IM, Diphenhydramine PO/IM    DISCONTINUED Quetiapine 100 mg QHS (tc)  	- Switching to Risperidone    4. Medical conditions, other medication, consults  None    5. Psychosocial interventions  - Patient provided verbal consent to speak with family and friend Krista  - CO 1:1: Not required  - Restrictions/allowances: None    
Patient is a 24-year-old male with a history of bipolar disorder and psychosis brought in by friend for manic symptoms for one week. The patient was agitated and attempted to leave the ED. He expresses numerous grandiose delusions, pressured speech, disorganization, and indiscretion. This is in the context of medication noncompliance. The differential includes bipolar disorder, brief psychotic disorder, and substance-induced bipolar disorder. Given his recent manic symptoms and disorganization, he is unable to care for himself and therefore meets criteria for inpatient psychiatric hospitalization.  SH: Lives with mother, single. Attends [employer] and plays soccer.  PsyHx: 1x hospitalization in 2020. No SA.     Week of 7/25: Patient started the week discharge-focused, with no insight into tc. As we titrated Risperdal and Ativan his manic symptoms improved and he was less discharge-focused.     1. Admission status  9.27 (Involuntary admission)    2. Significant lab findings  None    3. Psychotropic medication  - Risperidone 6 mg QHS (tc)  	- Start 7/21, inc 7/22, inc 7/24, inc 7/26, inc 7/29  - Lorazepam 1 mg QHS (tc)  	- Start 7/24, dec 7/30  - Trazodone 50 mg QHS (insomnia)  	- Start 7/23    Agitation PRNs: Haloperidol PO/IM, Lorazepam PO/IM, Diphenhydramine PO/IM    DISCONTINUED Quetiapine 100 mg QHS (tc)  	- Switching to Risperidone    4. Medical conditions, other medication, consults  A) Stye  - Encourage use of warm compresses    5. Psychosocial interventions  - Patient provided verbal consent to speak with family and friend Krista  - CO 1:1: Not required  - Restrictions/allowances: None

## 2024-08-08 NOTE — BH TREATMENT PLAN - NSTXDCOPLKINTERSW_PSY_ALL_CORE
SW will provide pt with support, discuss aftercare plans, make appropriate referrals for mental health and discuss discharge readiness with team.
SW  provided pt with support, discussed aftercare plans, made appropriate referrals for mental health and discussed discharge readiness with team.
SW will provide pt with support, discuss aftercare plans, make appropriate referrals for mental health and discuss discharge readiness with team.

## 2024-08-08 NOTE — BH INPATIENT PSYCHIATRY PROGRESS NOTE - NSTXDCOPLKDATEEST_PSY_ALL_CORE
29-Jul-2024
05-Aug-2024
29-Jul-2024
05-Aug-2024
22-Jul-2024
05-Aug-2024
22-Jul-2024
29-Jul-2024
22-Jul-2024
22-Jul-2024
29-Jul-2024
05-Aug-2024
22-Jul-2024
22-Jul-2024
29-Jul-2024

## 2024-08-08 NOTE — BH INPATIENT PSYCHIATRY PROGRESS NOTE - NSTXDISORGDATEEST_PSY_ALL_CORE
21-Jul-2024

## 2024-08-08 NOTE — BH INPATIENT PSYCHIATRY PROGRESS NOTE - NSBHCONSBHPROVCNTCTNOFT_PSY_A_CORE
not available
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
## 2024-08-08 NOTE — BH INPATIENT PSYCHIATRY PROGRESS NOTE - NSCGIIMPROVESX_PSY_ALL_CORE
4 = No change - symptoms remain essentially unchanged
2 = Much improved - notably better with signficant reduction of symptoms; increase in the level of functioning but some symptoms remain
3 = Minimally improved - slightly better with little or no clinically meaningful reduction of symptoms.  Represents very little change in basic clinical status, level of care, or functional capacity.
2 = Much improved - notably better with signficant reduction of symptoms; increase in the level of functioning but some symptoms remain
3 = Minimally improved - slightly better with little or no clinically meaningful reduction of symptoms.  Represents very little change in basic clinical status, level of care, or functional capacity.
4 = No change - symptoms remain essentially unchanged
4 = No change - symptoms remain essentially unchanged
2 = Much improved - notably better with signficant reduction of symptoms; increase in the level of functioning but some symptoms remain
4 = No change - symptoms remain essentially unchanged
2 = Much improved - notably better with signficant reduction of symptoms; increase in the level of functioning but some symptoms remain
3 = Minimally improved - slightly better with little or no clinically meaningful reduction of symptoms.  Represents very little change in basic clinical status, level of care, or functional capacity.

## 2024-08-08 NOTE — BH INPATIENT PSYCHIATRY PROGRESS NOTE - NSCGISEVERILLNESS_PSY_ALL_CORE
4 = Moderately ill – overt symptoms causing noticeable, but modest, functional impairment or distress; symptom level may warrant medication
3 = Mildly ill – clearly established symptoms with minimal, if any, distress or difficulty in social and occupational function
4 = Moderately ill – overt symptoms causing noticeable, but modest, functional impairment or distress; symptom level may warrant medication
3 = Mildly ill – clearly established symptoms with minimal, if any, distress or difficulty in social and occupational function
4 = Moderately ill – overt symptoms causing noticeable, but modest, functional impairment or distress; symptom level may warrant medication
3 = Mildly ill – clearly established symptoms with minimal, if any, distress or difficulty in social and occupational function
4 = Moderately ill – overt symptoms causing noticeable, but modest, functional impairment or distress; symptom level may warrant medication
3 = Mildly ill – clearly established symptoms with minimal, if any, distress or difficulty in social and occupational function
4 = Moderately ill – overt symptoms causing noticeable, but modest, functional impairment or distress; symptom level may warrant medication

## 2024-08-08 NOTE — BH INPATIENT PSYCHIATRY PROGRESS NOTE - NSBHCHARTREVIEWVS_PSY_A_CORE FT
Vital Signs Last 24 Hrs  T(C): 36.5 (08-08-24 @ 08:26), Max: 36.7 (08-07-24 @ 21:23)  T(F): 97.7 (08-08-24 @ 08:26), Max: 98.1 (08-07-24 @ 21:23)  HR: --  BP: --  BP(mean): --  RR: --  SpO2: --    Orthostatic VS  08-08-24 @ 08:26  Lying BP: --/-- HR: --  Sitting BP: 128/74 HR: 83  Standing BP: 113/68 HR: 94  Site: --  Mode: --  Orthostatic VS  08-07-24 @ 06:08  Lying BP: --/-- HR: --  Sitting BP: 117/80 HR: 80  Standing BP: 118/65 HR: 90  Site: --  Mode: --

## 2024-08-08 NOTE — BH INPATIENT PSYCHIATRY PROGRESS NOTE - NSTXMANICGOAL_PSY_ALL_CORE
Exhibit a substantial reduction in elated/angry acting out, and pressured speech that prevents mutual conversation

## 2024-08-08 NOTE — BH INPATIENT PSYCHIATRY PROGRESS NOTE - NSBHATTESTBILLING_PSY_A_CORE
[id number]-Zylehdacdq OBS or IP - moderate complexity OR 35-49 mins
[id number]-Rgovvwmlzs OBS or IP - moderate complexity OR 35-49 mins
[id number]-Msppxgawnk OBS or IP - moderate complexity OR 35-49 mins
[id number]-Hgmwqgccig OBS or IP - moderate complexity OR 35-49 mins
[id number]-Fjbepvnfae OBS or IP - moderate complexity OR 35-49 mins
[id number]-Zadtbrksjc OBS or IP - moderate complexity OR 35-49 mins
[id number]-Qulhsanrci OBS or IP - moderate complexity OR 35-49 mins
48582-Caprfhtfii OBS or IP - low complexity OR 25-34 mins
[id number]-Aaubwqaicl OBS or IP - moderate complexity OR 35-49 mins
[id number]-Klrlfwtydd OBS or IP - moderate complexity OR 35-49 mins
[id number]-Yywfyxyuxv OBS or IP - moderate complexity OR 35-49 mins
[id number]-Mlnzfmxoyd OBS or IP - moderate complexity OR 35-49 mins
12001-Adwgekgtpe OBS or IP - moderate complexity OR 35-49 mins
[id number]-Ovcrwldeep OBS or IP - moderate complexity OR 35-49 mins
[id number]-Gbzzdvisbj OBS or IP - low complexity OR 25-34 mins
38945-Gigiqtcciq OBS or IP - moderate complexity OR 35-49 mins
23553-Wtvdxcefof OBS or IP - moderate complexity OR 35-49 mins
[id number]-Lfjsbkvwaa OBS or IP - moderate complexity OR 35-49 mins

## 2024-08-08 NOTE — BH INPATIENT PSYCHIATRY PROGRESS NOTE - PRN MEDS
MEDICATIONS  (PRN):  diphenhydrAMINE 50 milliGRAM(s) Oral every 6 hours PRN Rash and/or Itching/agitation/eps prophylaxis  diphenhydrAMINE Injectable 50 milliGRAM(s) IntraMuscular once PRN agitation/eps prophylaxis  haloperidol     Tablet 5 milliGRAM(s) Oral every 6 hours PRN agitation  haloperidol    Injectable 5 milliGRAM(s) IntraMuscular once PRN severe agitation  hydrOXYzine hydrochloride 25 milliGRAM(s) Oral every 6 hours PRN anxiety  ibuprofen  Tablet. 400 milliGRAM(s) Oral every 6 hours PRN Moderate Pain (4 - 6)  LORazepam     Tablet 2 milliGRAM(s) Oral every 4 hours PRN anxiety/agitation  LORazepam   Injectable 2 milliGRAM(s) IntraMuscular once PRN severe anxiety/severe agitation

## 2024-08-08 NOTE — BH INPATIENT PSYCHIATRY PROGRESS NOTE - NSTXDISORGGOAL_PSY_ALL_CORE
Will identify 2 coping skills that assist in organizing

## 2024-08-08 NOTE — BH INPATIENT PSYCHIATRY PROGRESS NOTE - NSBHMSEKNOWHOW_PSY_ALL_CORE
Educational attainment

## 2024-08-08 NOTE — BH INPATIENT PSYCHIATRY PROGRESS NOTE - NSTXDCOPLKPROGRES_PSY_ALL_CORE
Improving
No Change
Improving
No Change
Improving
No Change

## 2024-08-08 NOTE — BH INPATIENT PSYCHIATRY PROGRESS NOTE - NSBHMSEATTEN_PSY_A_CORE
Normal
Impaired
Normal
Impaired
Normal
Normal
Impaired
Normal

## 2024-08-08 NOTE — BH INPATIENT PSYCHIATRY PROGRESS NOTE - NSBHMSEGROOM_PSY_A_CORE
Patient has appointment tomorrow.  She is requesting labs be drawn prior if needed.  Last labs drawn in  10/2022  
Good
Fair
Good
Fair
Good
Fair
Good
Good

## 2024-08-08 NOTE — BH INPATIENT PSYCHIATRY PROGRESS NOTE - NSICDXBHPRIMARYDX_PSY_ALL_CORE
Bipolar 1 disorder   F31.9  

## 2024-08-08 NOTE — BH INPATIENT PSYCHIATRY PROGRESS NOTE - NSBHMSEMOOD_PSY_A_CORE
Normal
Irritable
Normal
Irritable
Normal
Irritable
Normal

## 2024-08-08 NOTE — BH INPATIENT PSYCHIATRY PROGRESS NOTE - NSTXMANICDATETRGT_PSY_ALL_CORE
28-Jul-2024
11-Aug-2024
08-Aug-2024
11-Aug-2024
04-Aug-2024
11-Aug-2024
28-Jul-2024
04-Aug-2024
28-Jul-2024
11-Aug-2024
28-Jul-2024
04-Aug-2024
04-Aug-2024

## 2024-08-08 NOTE — BH INPATIENT PSYCHIATRY PROGRESS NOTE - NSBHMSETHTPROC_PSY_A_CORE
Linear
Linear
Tangential
Disorganized/Tangential
Disorganized/Tangential
Tangential
Disorganized/Tangential
Tangential
Tangential
Linear
Tangential
Linear
Linear
Tangential
Tangential
Linear
Linear
Tangential

## 2024-08-08 NOTE — BH TREATMENT PLAN - NSTXMANICINTERPR_PSY_ALL_CORE
Psychiatric rehabilitation staff will continue to support patient via 1:1 engagement and encourage programming attendance in effort to facilitate continued progress towards goal.
During time of engagement, patient presented with euthymic mood. Patient was dressed in casual clothing and was fairly groomed.  Upon review, patient has made some progress towards specified goal as evidenced by his ability his ability to engage in a conversation with writer without being hyperverbal and is much calmer on the unit. Within the group setting, patient has been appropriately participatory and has attended some groups. Psychiatric rehabilitation staff will continue to support patient via 1:1 engagement and encourage programming attendance in effort to facilitate continued progress towards goal.
During time of engagement, patient presented with slightly elevated mood, but notably less so compared to writer's last meeting with patient. Patient was dressed in casual clothing and was fairly groomed.  Upon review, patient has made some progress towards specified goal as evidenced by his less pressured speech and calmer demeanor with writer. Within the group setting, patient has been appropriately participatory and has attended many groups. Psychiatric rehabilitation staff will continue to support patient via 1:1 engagement and encourage programming attendance in effort to facilitate continued progress towards goal.

## 2024-08-08 NOTE — BH INPATIENT PSYCHIATRY PROGRESS NOTE - NSTXDISORGINTERMD_PSY_ALL_CORE
Psychopharmacology  Psychoeducation

## 2024-08-08 NOTE — BH INPATIENT PSYCHIATRY PROGRESS NOTE - NSTXDISORGDATETRGT_PSY_ALL_CORE
28-Jul-2024

## 2024-08-08 NOTE — BH INPATIENT PSYCHIATRY PROGRESS NOTE - NSBHFUPINTERVALHXFT_PSY_A_CORE
Chart reviewed, including vital signs, medication administration record, lab results, and nursing notes.   Case discussed with nursing staff   - No acute events    Patient is seen in the group room under no acute distress and amenable to interview. The patient denies any issues with the plan for discharge today. He feels “excited and nervous“. He is looking forward to playing soccer again. I encourage him to remain compliant with medication and abstain from drugs of abuse. Patient reports stable sleep and appetite. Denies SI/HI/AVH. Reports no medication side effects.

## 2024-08-08 NOTE — BH INPATIENT PSYCHIATRY PROGRESS NOTE - NSBHMSESPEECH_PSY_A_CORE
Abnormal as indicated, otherwise normal...
Abnormal as indicated, otherwise normal...
Normal volume, rate, productivity, spontaneity and articulation
Normal volume, rate, productivity, spontaneity and articulation
Abnormal as indicated, otherwise normal...
Normal volume, rate, productivity, spontaneity and articulation
Abnormal as indicated, otherwise normal...
Normal volume, rate, productivity, spontaneity and articulation
Abnormal as indicated, otherwise normal...
Normal volume, rate, productivity, spontaneity and articulation
Abnormal as indicated, otherwise normal...

## 2024-08-08 NOTE — BH INPATIENT PSYCHIATRY PROGRESS NOTE - NSBHMETABOLIC_PSY_ALL_CORE_FT
BMI:   HbA1c: A1C with Estimated Average Glucose Result: 4.9 % (07-22-24 @ 09:15)    Glucose:   BP: --Vital Signs Last 24 Hrs  T(C): 36.5 (08-08-24 @ 08:26), Max: 36.7 (08-07-24 @ 21:23)  T(F): 97.7 (08-08-24 @ 08:26), Max: 98.1 (08-07-24 @ 21:23)  HR: --  BP: --  BP(mean): --  RR: --  SpO2: --    Orthostatic VS  08-08-24 @ 08:26  Lying BP: --/-- HR: --  Sitting BP: 128/74 HR: 83  Standing BP: 113/68 HR: 94  Site: --  Mode: --  Orthostatic VS  08-07-24 @ 06:08  Lying BP: --/-- HR: --  Sitting BP: 117/80 HR: 80  Standing BP: 118/65 HR: 90  Site: --  Mode: --    Lipid Panel: Date/Time: 07-22-24 @ 09:15  Cholesterol, Serum: 167  LDL Cholesterol Calculated: 91  HDL Cholesterol, Serum: 67  Total Cholesterol/HDL Ration Measurement: --  Triglycerides, Serum: 45

## 2024-08-08 NOTE — BH TREATMENT PLAN - NSTXDCOPLKGOAL_PSY_ALL_CORE
Will agree to consider an appropriate level of outpatient care
done

## 2024-08-08 NOTE — BH INPATIENT PSYCHIATRY PROGRESS NOTE - NSBHMSETHTASSOC_PSY_A_CORE
Normal
Normal
Loose
Normal
Loose
Loose
Normal

## 2024-08-14 NOTE — SOCIAL WORK POST DISCHARGE FOLLOW UP NOTE - NSBHSWFOLLOWUP_PSY_ALL_CORE_FT
Writer is LEANNE del angel covering for unit SW.  Per team, patient's mother Mami [phone] needs assistance in rescheduling patient's follow up appointment at the [facility] since he missed the first appointment.  Writer contacted her and she states that patient was unable to make scheduled appointment but wants to reschedule.  Writer will assist in rescheduling this appointment.

## 2024-08-14 NOTE — SOCIAL WORK POST DISCHARGE FOLLOW UP NOTE - NSBHSWFOLLOWUP_PSY_ALL_CORE_FT
Writer is LEANNE del angel covering for unit SW.  Writer contacted patient's mother, Mami [phone] and provided her with another intake appointment for - Tuesday 9/3/24 @ 3:00 pm with Dr. Deepika Rivera & Dr. Patiño (in-person appointment).  Writer provided her with the GPS address:  [facility]- 1st floor [address] [facility] [address]  (Door # 3 to enter the building.)  She is aware that patient must arrive 20 minutes early to the appointment.  Discussed going to [facility] prior to this appointment if necessary.

## 2024-08-14 NOTE — SOCIAL WORK POST DISCHARGE FOLLOW UP NOTE - NSBHSWFOLLOWUP_PSY_ALL_CORE_FT
Writer is LEANNE del angel covering for unit SW.  Per the [facility], they will reach out to patient's mother to reschedule patient's appointment.

## 2024-11-25 NOTE — BH INPATIENT PSYCHIATRY PROGRESS NOTE - NSBHMSEAFFCONG_PSY_A_CORE
Congruent
[FreeTextEntry1] :  referred by Dr. Reynolds   for GI Evaluation
[FreeTextEntry1] :  referred by Dr. Reynolds   for GI Evaluation
Congruent